# Patient Record
Sex: FEMALE | Race: WHITE | NOT HISPANIC OR LATINO | Employment: UNEMPLOYED | URBAN - METROPOLITAN AREA
[De-identification: names, ages, dates, MRNs, and addresses within clinical notes are randomized per-mention and may not be internally consistent; named-entity substitution may affect disease eponyms.]

---

## 2022-06-16 ENCOUNTER — HOSPITAL ENCOUNTER (INPATIENT)
Facility: HOSPITAL | Age: 47
LOS: 1 days | Discharge: HOME | DRG: 661 | End: 2022-06-17
Attending: EMERGENCY MEDICINE | Admitting: UROLOGY
Payer: OTHER GOVERNMENT

## 2022-06-16 ENCOUNTER — ANESTHESIA EVENT (OUTPATIENT)
Dept: SURGERY | Facility: HOSPITAL | Age: 47
DRG: 661 | End: 2022-06-16
Payer: OTHER GOVERNMENT

## 2022-06-16 DIAGNOSIS — R10.9 LEFT FLANK PAIN: ICD-10-CM

## 2022-06-16 DIAGNOSIS — N20.1 LEFT URETERAL STONE: Primary | ICD-10-CM

## 2022-06-16 DIAGNOSIS — N20.0 NEPHROLITHIASIS: ICD-10-CM

## 2022-06-16 DIAGNOSIS — N20.0 KIDNEY STONE: ICD-10-CM

## 2022-06-16 LAB
ALBUMIN SERPL BCP-MCNC: 4.2 G/DL (ref 3.5–5.2)
ALP SERPL-CCNC: 71 U/L (ref 55–135)
ALT SERPL W/O P-5'-P-CCNC: 16 U/L (ref 10–44)
ANION GAP SERPL CALC-SCNC: 11 MMOL/L (ref 8–16)
AST SERPL-CCNC: 22 U/L (ref 10–40)
BACTERIA #/AREA URNS AUTO: ABNORMAL /HPF
BASOPHILS # BLD AUTO: 0.04 K/UL (ref 0–0.2)
BASOPHILS NFR BLD: 0.3 % (ref 0–1.9)
BILIRUB SERPL-MCNC: 0.9 MG/DL (ref 0.1–1)
BILIRUB UR QL STRIP: NEGATIVE
BUN SERPL-MCNC: 11 MG/DL (ref 6–20)
CALCIUM SERPL-MCNC: 9.1 MG/DL (ref 8.7–10.5)
CHLORIDE SERPL-SCNC: 104 MMOL/L (ref 95–110)
CLARITY UR REFRACT.AUTO: ABNORMAL
CO2 SERPL-SCNC: 22 MMOL/L (ref 23–29)
COLOR UR AUTO: YELLOW
CREAT SERPL-MCNC: 0.9 MG/DL (ref 0.5–1.4)
CTP QC/QA: YES
DIFFERENTIAL METHOD: ABNORMAL
EOSINOPHIL # BLD AUTO: 0 K/UL (ref 0–0.5)
EOSINOPHIL NFR BLD: 0 % (ref 0–8)
ERYTHROCYTE [DISTWIDTH] IN BLOOD BY AUTOMATED COUNT: 12 % (ref 11.5–14.5)
EST. GFR  (AFRICAN AMERICAN): >60 ML/MIN/1.73 M^2
EST. GFR  (NON AFRICAN AMERICAN): >60 ML/MIN/1.73 M^2
GLUCOSE SERPL-MCNC: 93 MG/DL (ref 70–110)
GLUCOSE UR QL STRIP: NEGATIVE
HCT VFR BLD AUTO: 40.7 % (ref 37–48.5)
HGB BLD-MCNC: 13.5 G/DL (ref 12–16)
HGB UR QL STRIP: ABNORMAL
IMM GRANULOCYTES # BLD AUTO: 0.04 K/UL (ref 0–0.04)
IMM GRANULOCYTES NFR BLD AUTO: 0.3 % (ref 0–0.5)
KETONES UR QL STRIP: ABNORMAL
LEUKOCYTE ESTERASE UR QL STRIP: ABNORMAL
LIPASE SERPL-CCNC: 17 U/L (ref 4–60)
LYMPHOCYTES # BLD AUTO: 0.4 K/UL (ref 1–4.8)
LYMPHOCYTES NFR BLD: 2.9 % (ref 18–48)
MCH RBC QN AUTO: 30.4 PG (ref 27–31)
MCHC RBC AUTO-ENTMCNC: 33.2 G/DL (ref 32–36)
MCV RBC AUTO: 92 FL (ref 82–98)
MICROSCOPIC COMMENT: ABNORMAL
MONOCYTES # BLD AUTO: 0.7 K/UL (ref 0.3–1)
MONOCYTES NFR BLD: 4.7 % (ref 4–15)
NEUTROPHILS # BLD AUTO: 13.2 K/UL (ref 1.8–7.7)
NEUTROPHILS NFR BLD: 91.8 % (ref 38–73)
NITRITE UR QL STRIP: NEGATIVE
NRBC BLD-RTO: 0 /100 WBC
PH UR STRIP: 5 [PH] (ref 5–8)
PLATELET # BLD AUTO: 201 K/UL (ref 150–450)
PMV BLD AUTO: 10.8 FL (ref 9.2–12.9)
POTASSIUM SERPL-SCNC: 3.7 MMOL/L (ref 3.5–5.1)
PROT SERPL-MCNC: 7.5 G/DL (ref 6–8.4)
PROT UR QL STRIP: NEGATIVE
RBC # BLD AUTO: 4.44 M/UL (ref 4–5.4)
RBC #/AREA URNS AUTO: 14 /HPF (ref 0–4)
SARS-COV-2 RDRP RESP QL NAA+PROBE: NEGATIVE
SODIUM SERPL-SCNC: 137 MMOL/L (ref 136–145)
SP GR UR STRIP: 1.01 (ref 1–1.03)
SQUAMOUS #/AREA URNS AUTO: 2 /HPF
URN SPEC COLLECT METH UR: ABNORMAL
WBC # BLD AUTO: 14.34 K/UL (ref 3.9–12.7)
WBC #/AREA URNS AUTO: 17 /HPF (ref 0–5)

## 2022-06-16 PROCEDURE — 80053 COMPREHEN METABOLIC PANEL: CPT | Performed by: PHYSICIAN ASSISTANT

## 2022-06-16 PROCEDURE — G0378 HOSPITAL OBSERVATION PER HR: HCPCS

## 2022-06-16 PROCEDURE — 85025 COMPLETE CBC W/AUTO DIFF WBC: CPT | Performed by: PHYSICIAN ASSISTANT

## 2022-06-16 PROCEDURE — 25000003 PHARM REV CODE 250: Performed by: PHYSICIAN ASSISTANT

## 2022-06-16 PROCEDURE — 99285 EMERGENCY DEPT VISIT HI MDM: CPT | Mod: CS,,, | Performed by: PHYSICIAN ASSISTANT

## 2022-06-16 PROCEDURE — 99222 PR INITIAL HOSPITAL CARE,LEVL II: ICD-10-PCS | Mod: ,,, | Performed by: UROLOGY

## 2022-06-16 PROCEDURE — 99285 PR EMERGENCY DEPT VISIT,LEVEL V: ICD-10-PCS | Mod: CS,,, | Performed by: PHYSICIAN ASSISTANT

## 2022-06-16 PROCEDURE — 81001 URINALYSIS AUTO W/SCOPE: CPT | Performed by: PHYSICIAN ASSISTANT

## 2022-06-16 PROCEDURE — 96374 THER/PROPH/DIAG INJ IV PUSH: CPT

## 2022-06-16 PROCEDURE — 83690 ASSAY OF LIPASE: CPT | Performed by: PHYSICIAN ASSISTANT

## 2022-06-16 PROCEDURE — 87086 URINE CULTURE/COLONY COUNT: CPT | Performed by: PHYSICIAN ASSISTANT

## 2022-06-16 PROCEDURE — 99285 EMERGENCY DEPT VISIT HI MDM: CPT | Mod: 25

## 2022-06-16 PROCEDURE — 63600175 PHARM REV CODE 636 W HCPCS: Performed by: PHYSICIAN ASSISTANT

## 2022-06-16 PROCEDURE — U0002 COVID-19 LAB TEST NON-CDC: HCPCS | Performed by: STUDENT IN AN ORGANIZED HEALTH CARE EDUCATION/TRAINING PROGRAM

## 2022-06-16 PROCEDURE — 99222 1ST HOSP IP/OBS MODERATE 55: CPT | Mod: ,,, | Performed by: UROLOGY

## 2022-06-16 RX ORDER — PROCHLORPERAZINE EDISYLATE 5 MG/ML
5 INJECTION INTRAMUSCULAR; INTRAVENOUS EVERY 6 HOURS PRN
Status: DISCONTINUED | OUTPATIENT
Start: 2022-06-16 | End: 2022-06-17 | Stop reason: HOSPADM

## 2022-06-16 RX ORDER — SODIUM CHLORIDE AND POTASSIUM CHLORIDE 150; 900 MG/100ML; MG/100ML
INJECTION, SOLUTION INTRAVENOUS CONTINUOUS
Status: DISCONTINUED | OUTPATIENT
Start: 2022-06-17 | End: 2022-06-17 | Stop reason: HOSPADM

## 2022-06-16 RX ORDER — DEXTROSE MONOHYDRATE, SODIUM CHLORIDE, AND POTASSIUM CHLORIDE 50; 1.49; 4.5 G/1000ML; G/1000ML; G/1000ML
INJECTION, SOLUTION INTRAVENOUS CONTINUOUS
Status: DISCONTINUED | OUTPATIENT
Start: 2022-06-17 | End: 2022-06-16

## 2022-06-16 RX ORDER — KETOROLAC TROMETHAMINE 30 MG/ML
10 INJECTION, SOLUTION INTRAMUSCULAR; INTRAVENOUS
Status: COMPLETED | OUTPATIENT
Start: 2022-06-16 | End: 2022-06-16

## 2022-06-16 RX ORDER — SODIUM CHLORIDE 0.9 % (FLUSH) 0.9 %
10 SYRINGE (ML) INJECTION
Status: DISCONTINUED | OUTPATIENT
Start: 2022-06-16 | End: 2022-06-17 | Stop reason: HOSPADM

## 2022-06-16 RX ORDER — OXYCODONE HYDROCHLORIDE 10 MG/1
10 TABLET ORAL EVERY 4 HOURS PRN
Status: DISCONTINUED | OUTPATIENT
Start: 2022-06-16 | End: 2022-06-17 | Stop reason: HOSPADM

## 2022-06-16 RX ORDER — TALC
6 POWDER (GRAM) TOPICAL NIGHTLY PRN
Status: DISCONTINUED | OUTPATIENT
Start: 2022-06-16 | End: 2022-06-17 | Stop reason: HOSPADM

## 2022-06-16 RX ORDER — OXYCODONE HYDROCHLORIDE 5 MG/1
5 TABLET ORAL EVERY 4 HOURS PRN
Status: DISCONTINUED | OUTPATIENT
Start: 2022-06-16 | End: 2022-06-17 | Stop reason: HOSPADM

## 2022-06-16 RX ORDER — ONDANSETRON 2 MG/ML
4 INJECTION INTRAMUSCULAR; INTRAVENOUS EVERY 6 HOURS PRN
Status: DISCONTINUED | OUTPATIENT
Start: 2022-06-16 | End: 2022-06-17 | Stop reason: HOSPADM

## 2022-06-16 RX ORDER — ONDANSETRON 2 MG/ML
4 INJECTION INTRAMUSCULAR; INTRAVENOUS
Status: DISCONTINUED | OUTPATIENT
Start: 2022-06-16 | End: 2022-06-16

## 2022-06-16 RX ADMIN — KETOROLAC TROMETHAMINE 10 MG: 30 INJECTION, SOLUTION INTRAMUSCULAR at 01:06

## 2022-06-16 RX ADMIN — SODIUM CHLORIDE 1000 ML: 0.9 INJECTION, SOLUTION INTRAVENOUS at 01:06

## 2022-06-16 NOTE — ASSESSMENT & PLAN NOTE
- Left 8 mm UVJ stone noted on CT scan with hydronephrosis  - Unclear picture of presence of UTI due to recent antibiotic use  - Urine concerning for infection, WBC slightly elevated to 14  - Patient not NPO  - Place in observation with urology  - Pain and nausea control  - Will add on for cystoscopy, left stent placement tomorrow  - Will continue to monitor and add on more urgently if clinical condition worsens

## 2022-06-16 NOTE — SUBJECTIVE & OBJECTIVE
History reviewed. No pertinent past medical history.    History reviewed. No pertinent surgical history.    Review of patient's allergies indicates:   Allergen Reactions    Sulfa (sulfonamide antibiotics) Rash       Family History    None         Tobacco Use    Smoking status: Never Smoker    Smokeless tobacco: Never Used   Substance and Sexual Activity    Alcohol use: Yes     Comment: Daily wine    Drug use: Not Currently    Sexual activity: Not on file       Review of Systems   Constitutional:  Negative for appetite change, chills, fatigue, fever and unexpected weight change.   HENT: Negative.     Eyes: Negative.    Respiratory:  Negative for cough, chest tightness and shortness of breath.    Cardiovascular:  Negative for chest pain, palpitations and leg swelling.   Gastrointestinal:  Positive for nausea. Negative for abdominal pain, constipation, diarrhea and vomiting.   Genitourinary:  Positive for dysuria, flank pain (left), frequency and urgency. Negative for hematuria.   Musculoskeletal:  Negative for back pain.   Skin:  Negative for rash.   Neurological:  Negative for dizziness, syncope, numbness and headaches.   Hematological:  Does not bruise/bleed easily.   Psychiatric/Behavioral: Negative.       Objective:     Temp:  [98.2 °F (36.8 °C)] 98.2 °F (36.8 °C)  Pulse:  [86] 86  Resp:  [16] 16  SpO2:  [96 %] 96 %  BP: (136)/(72) 136/72     Body mass index is 20.98 kg/m².           Drains       None                   Physical Exam  Constitutional:       General: She is not in acute distress.     Appearance: She is well-developed.   Eyes:      General: No scleral icterus.  Cardiovascular:      Rate and Rhythm: Normal rate.   Pulmonary:      Effort: Pulmonary effort is normal. No respiratory distress.   Abdominal:      General: Bowel sounds are normal. There is no distension.      Palpations: Abdomen is soft.   Musculoskeletal:         General: Normal range of motion.   Skin:     General: Skin is warm and dry.       Findings: No rash.   Neurological:      Mental Status: She is alert and oriented to person, place, and time.   Psychiatric:         Behavior: Behavior normal.       Significant Labs:    BMP:  Recent Labs   Lab 06/16/22  1315      K 3.7      CO2 22*   BUN 11   CREATININE 0.9   CALCIUM 9.1       CBC:  Recent Labs   Lab 06/16/22  1315   WBC 14.34*   HGB 13.5   HCT 40.7          All pertinent labs results from the past 24 hours have been reviewed.    Significant Imaging:  All pertinent imaging results/findings from the past 24 hours have been reviewed.

## 2022-06-16 NOTE — ED NOTES
Patient states left flank and lower left abd pain,nasuea, dry heaves onset 0800, denies fevers. Completed Cipro rx today for UTI today, daily ETOH

## 2022-06-16 NOTE — NURSING
Pt arrived to room 542 AAO*4, oriented pt to room. Pt reports she has IUD.no concerns at this time. VSS

## 2022-06-16 NOTE — ANESTHESIA PREPROCEDURE EVALUATION
[facility]  Anesthesia Pre-Operative Evaluation         Patient Name/: Sonia Summers, 1975  MRN: [id number]    SUBJECTIVE:     Pre-operative evaluation for Procedure(s) (LRB):  CYSTOSCOPY, WITH URETERAL STENT INSERTION (Left)     2022    Sonia Summers is a 46 y.o. female w/o any significant PMHx presented with flank pain. Found to have L ureteral stone.     Patient now presents for the above procedure(s).    ________________________________________  ECHO: No results found for this or any previous visit.    ________________________________________    Prev airway: None documented.    LDA:       Peripheral IV - Single Lumen 22 1316 20 G Left Antecubital (Active)   Site Assessment Clean;Dry;Intact 22 1316   Line Status Blood return noted 22 1316   Number of days: 0       Drips:    [START ON 2022] 0/9% NACL & POTASSIUM CHLORIDE 20 MEQ/L         Patient Active Problem List   Diagnosis    Left renal stone    Left ureteral stone       Review of patient's allergies indicates:   Allergen Reactions    Sulfa (sulfonamide antibiotics) Rash       Current Inpatient Medications:       No current facility-administered medications on file prior to encounter.     Current Outpatient Medications on File Prior to Encounter   Medication Sig Dispense Refill    methenamine hippurate (HIPREX ORAL) Take by mouth.         History reviewed. No pertinent surgical history.    Social History:  Tobacco Use: Low Risk     Smoking Tobacco Use: Never Smoker    Smokeless Tobacco Use: Never Used       Alcohol Use: Not on file       OBJECTIVE:     Vital Signs Range:  BMI Readings from Last 1 Encounters:   22 20.98 kg/m²       Temp:  [36.8 °C (98.2 °F)-36.8 °C (98.3 °F)]   Pulse:  [74-86]   Resp:  [16-18]   BP: (126-136)/(60-72)   SpO2:  [96 %-99 %]        Significant Labs:        Component Value Date/Time    WBC 14.34 (H) 2022 1315    HGB 13.5 2022 1315    HCT 40.7  06/16/2022 1315     06/16/2022 1315     06/16/2022 1315    K 3.7 06/16/2022 1315     06/16/2022 1315    CO2 22 (L) 06/16/2022 1315    GLU 93 06/16/2022 1315    BUN 11 06/16/2022 1315    CREATININE 0.9 06/16/2022 1315    CALCIUM 9.1 06/16/2022 1315    ALBUMIN 4.2 06/16/2022 1315    PROT 7.5 06/16/2022 1315    ALKPHOS 71 06/16/2022 1315    BILITOT 0.9 06/16/2022 1315    AST 22 06/16/2022 1315    ALT 16 06/16/2022 1315        Please see Results Review for additional labs.     Diagnostic Studies: No relevant studies.    EKG:   No results found for this or any previous visit.    ECHO:  See subjective, if available.      ASSESSMENT/PLAN:           Pre-op Assessment    I have reviewed the Patient Summary Reports.     I have reviewed the Nursing Notes. I have reviewed the NPO Status.   I have reviewed the Medications.     Review of Systems  Anesthesia Hx:  No problems with previous Anesthesia Denies Hx of Anesthetic complications  History of prior surgery of interest to airway management or planning: Denies Family Hx of Anesthesia complications.   Denies Personal Hx of Anesthesia complications.   Social:  Non-Smoker    Hematology/Oncology:  Hematology Normal   Oncology Normal    -- Denies Anemia:  Denies Current/Recent Cancer  --  Denies Cancer in past history:    EENT/Dental:EENT/Dental Normal   Pulmonary:  Pulmonary Normal    Renal/:  Renal/ Normal  Denies Chronic Renal Disease.     Hepatic/GI:  Hepatic/GI Normal  Denies GERD.    Musculoskeletal:  Musculoskeletal Normal    Neurological:  Neurology Normal  Denies CVA. Denies Seizures.    Endocrine:  Endocrine Normal Denies Diabetes.    Dermatological:  Skin Normal    Psych:  Psychiatric Normal           Physical Exam  General: Well nourished, Cooperative, Alert and Oriented    Airway:  Mallampati: II / II/ I  Mouth Opening: Normal  Tongue: Normal  Neck ROM: Normal ROM    Dental:  Intact        Anesthesia Plan  Type of Anesthesia, risks & benefits  discussed:    Anesthesia Type: Gen ETT, MAC  Intra-op Monitoring Plan: Standard ASA Monitors  Post Op Pain Control Plan: multimodal analgesia and IV/PO Opioids PRN  Induction:  IV  Airway Plan: Direct, Post-Induction  Informed Consent: Informed consent signed with the Patient and all parties understand the risks and agree with anesthesia plan.  All questions answered.   ASA Score: 1  Day of Surgery Review of History & Physical: H&P Update referred to the surgeon/provider.    Ready For Surgery From Anesthesia Perspective.     .

## 2022-06-16 NOTE — ED NOTES
Patient identifiers verified and correct for Ms Summers  C/C: Flank pain, abd pain SEE NN  APPEARANCE: awake and alert in NAD.  SKIN: warm, dry and intact. No breakdown or bruising.  MUSCULOSKELETAL: Patient moving all extremities spontaneously, no obvious swelling or deformities noted. Ambulates independently.  RESPIRATORY: Denies shortness of breath.Respirations unlabored. Denies fevers  CARDIAC: Denies CP, 2+ distal pulses; no peripheral edema  ABDOMEN: reports LLQ abd pain, flank pain nasuea,   : voids spontaneously, denies difficulty  Neurologic: AAO x 4; follows commands equal strength in all extremities; denies numbness/tingling. Denies dizziness  Deneis weakness

## 2022-06-16 NOTE — CONSULTS
[facility] - Emergency Dept  Urology  Consult Note    Patient Name: Sonia Summers  MRN: [id number]  Admission Date: 6/16/2022  Hospital Length of Stay: 0   Code Status: No Order   Attending Provider: Josesito Mayo MD   Consulting Provider: Parth Alvarez MD  Primary Care Physician: Primary Doctor No  Principal Problem:<principal problem not specified>    Inpatient consult to Urology  Consult performed by: Parth Alvarez MD  Consult ordered by: Sylvia Hilario PA-C  Reason for consult: left ureteral stone          Subjective:     HPI:  45 yo female who presents to [facility] ED with 4 days of intermittent left flank pain and left ureteral stone seen on CT scan. She states the pain is 8/10 colicky flank pain with associated frequency, urgency, and dysuria. No gross hematuria, fevers, or chills. She also has been having nausea without vomiting. She thought that she had a UTI and took 3 days of cipro from her home leftover doses; last dose taken today. UA micro in ED shows 14 RBC, 17 WBC, occasional bacteria, 2 squams. WBC is 14. Creatinine is 0.9. CT scan reviewed which shows. Left mild/moderate hydronephrosis with a calcification measuring 8 mm at the left UVJ. The course of the ureter is difficult to follow but no additional calcifications are seen in the path of the ureter. There are additional stones in the left kidney, one measuring 5 mm, 7.8 mm and 8.7 mm. She just ate crackers in the ED.      History reviewed. No pertinent past medical history.    History reviewed. No pertinent surgical history.    Review of patient's allergies indicates:   Allergen Reactions    Sulfa (sulfonamide antibiotics) Rash       Family History    None         Tobacco Use    Smoking status: Never Smoker    Smokeless tobacco: Never Used   Substance and Sexual Activity    Alcohol use: Yes     Comment: Daily wine    Drug use: Not Currently    Sexual activity: Not on file       Review of Systems   Constitutional:  Negative for appetite change,  chills, fatigue, fever and unexpected weight change.   HENT: Negative.     Eyes: Negative.    Respiratory:  Negative for cough, chest tightness and shortness of breath.    Cardiovascular:  Negative for chest pain, palpitations and leg swelling.   Gastrointestinal:  Positive for nausea. Negative for abdominal pain, constipation, diarrhea and vomiting.   Genitourinary:  Positive for dysuria, flank pain (left), frequency and urgency. Negative for hematuria.   Musculoskeletal:  Negative for back pain.   Skin:  Negative for rash.   Neurological:  Negative for dizziness, syncope, numbness and headaches.   Hematological:  Does not bruise/bleed easily.   Psychiatric/Behavioral: Negative.       Objective:     Temp:  [98.2 °F (36.8 °C)] 98.2 °F (36.8 °C)  Pulse:  [86] 86  Resp:  [16] 16  SpO2:  [96 %] 96 %  BP: (136)/(72) 136/72     Body mass index is 20.98 kg/m².           Drains       None                   Physical Exam  Constitutional:       General: She is not in acute distress.     Appearance: She is well-developed.   Eyes:      General: No scleral icterus.  Cardiovascular:      Rate and Rhythm: Normal rate.   Pulmonary:      Effort: Pulmonary effort is normal. No respiratory distress.   Abdominal:      General: Bowel sounds are normal. There is no distension.      Palpations: Abdomen is soft.   Musculoskeletal:         General: Normal range of motion.   Skin:     General: Skin is warm and dry.      Findings: No rash.   Neurological:      Mental Status: She is alert and oriented to person, place, and time.   Psychiatric:         Behavior: Behavior normal.       Significant Labs:    BMP:  Recent Labs   Lab 06/16/22  1315      K 3.7      CO2 22*   BUN 11   CREATININE 0.9   CALCIUM 9.1       CBC:  Recent Labs   Lab 06/16/22  1315   WBC 14.34*   HGB 13.5   HCT 40.7          All pertinent labs results from the past 24 hours have been reviewed.    Significant Imaging:  All pertinent imaging results/findings  from the past 24 hours have been reviewed.      Assessment and Plan:     Left ureteral stone  - Left 8 mm UVJ stone noted on CT scan with hydronephrosis  - Unclear picture of presence of UTI due to recent antibiotic use  - Urine concerning for infection, WBC slightly elevated to 14  - Patient not NPO  - Place in observation with urology  - Pain and nausea control  - Will add on for cystoscopy, left stent placement tomorrow  - Will continue to monitor and add on more urgently if clinical condition worsens        VTE Risk Mitigation (From admission, onward)      None            Thank you for your consult. I will follow-up with patient. Please contact us if you have any additional questions.    Parth Alvarez MD  Urology  [facility] - Emergency Dept    Reviewed patient's imaging and chart.  Given that she just ate and is stable, will make NPO after midnight and add her on for stent tomorrow.

## 2022-06-16 NOTE — ED PROVIDER NOTES
"Encounter Date: 6/16/2022       History     Chief Complaint   Patient presents with    Flank Pain     Left sided flank pain started 0800 today, +nausea     47 y/o F with history of recurrent UTIs presents to the ED c/o L flank pain since 8a.  The pain is waxing and waning, currently 4-5/10 "cramping" to the L flank and lower abdomen and associated with significant nausea.  She has taken OTC tylenol with no relief.  She denies any known history of kidney stones.  No trauma. PSHx significant for C section x 1.  She was having some UTI symptoms last week - tried to call her outside urologist to get an rx but they were out of town.  Clinic told her it would be at least 3 days before anyone could get back to her. Her  is a physician so he called in ciprofloxacin for her - her last dose was this morning.  Since the abx, UTI symptoms have improved. She denies fever, chills, chest pain, SOB, dysuria, hematuria, frequency.     The history is provided by the patient.     Review of patient's allergies indicates:   Allergen Reactions    Sulfa (sulfonamide antibiotics) Rash     History reviewed. No pertinent past medical history.  History reviewed. No pertinent surgical history.  History reviewed. No pertinent family history.  Social History     Tobacco Use    Smoking status: Never Smoker    Smokeless tobacco: Never Used   Substance Use Topics    Alcohol use: Yes     Comment: Daily wine    Drug use: Not Currently     Review of Systems   Constitutional: Negative for chills and fever.   HENT: Negative for congestion, postnasal drip and sore throat.    Eyes: Negative for photophobia and visual disturbance.   Respiratory: Negative for cough and shortness of breath.    Cardiovascular: Negative for chest pain.   Gastrointestinal: Positive for abdominal pain and nausea. Negative for constipation, diarrhea and vomiting.   Genitourinary: Positive for flank pain. Negative for dysuria, hematuria and urgency.   Musculoskeletal: " Positive for back pain.   Skin: Negative for rash.   Neurological: Negative for syncope, weakness, light-headedness and headaches.   Psychiatric/Behavioral: Negative for confusion.       Physical Exam     Initial Vitals [06/16/22 1240]   BP Pulse Resp Temp SpO2   136/72 86 16 98.2 °F (36.8 °C) 96 %      MAP       --         Physical Exam    Nursing note and vitals reviewed.  Constitutional: She appears well-developed and well-nourished. She is not diaphoretic. No distress.   HENT:   Head: Normocephalic and atraumatic.   Neck: Neck supple.   Normal range of motion.  Cardiovascular: Normal rate, regular rhythm and normal heart sounds. Exam reveals no gallop and no friction rub.    No murmur heard.  Pulmonary/Chest: Breath sounds normal. She has no wheezes. She has no rhonchi. She has no rales.   Abdominal: Abdomen is soft. Bowel sounds are normal. There is no abdominal tenderness.   Mild discomfort to the L mid abdomen   No right CVA tenderness.  No left CVA tenderness. There is no rebound and no guarding.   Musculoskeletal:         General: Normal range of motion.      Cervical back: Normal range of motion and neck supple.     Neurological: She is alert and oriented to person, place, and time.   Skin: Skin is warm and dry.   Psychiatric: She has a normal mood and affect.         ED Course   Procedures  Labs Reviewed   URINALYSIS, REFLEX TO URINE CULTURE - Abnormal; Notable for the following components:       Result Value    Appearance, UA Hazy (*)     Ketones, UA 1+ (*)     Occult Blood UA 1+ (*)     Leukocytes, UA Trace (*)     All other components within normal limits    Narrative:     Specimen Source->Urine   CBC W/ AUTO DIFFERENTIAL - Abnormal; Notable for the following components:    WBC 14.34 (*)     Gran # (ANC) 13.2 (*)     Lymph # 0.4 (*)     Gran % 91.8 (*)     Lymph % 2.9 (*)     All other components within normal limits   COMPREHENSIVE METABOLIC PANEL - Abnormal; Notable for the following components:     CO2 22 (*)     All other components within normal limits   URINALYSIS MICROSCOPIC - Abnormal; Notable for the following components:    RBC, UA 14 (*)     WBC, UA 17 (*)     All other components within normal limits    Narrative:     Specimen Source->Urine   CULTURE, URINE   LIPASE   SARS-COV-2 RDRP GENE          Imaging Results           CT Renal Stone Study ABD Pelvis WO (Final result)  Result time 06/16/22 14:17:50    Final result by Ashvin Gorman Jr., MD (06/16/22 14:17:50)                 Impression:      There is dilatation of the left renal collecting system and ureter.  Ureter is difficult to follow to the bladder however there is a probable 5 x 8 mm calcification in the left distal ureter at or just proximal to the UVJ.    Additional left intrarenal stones as above.    Additional findings as above    This report was flagged in Epic as abnormal.      Electronically signed by: Ashvin Gorman MD  Date:    06/16/2022  Time:    14:17             Narrative:    EXAMINATION:  CT RENAL STONE STUDY ABD PELVIS WO    CLINICAL HISTORY:  Flank pain, kidney stone suspected;    TECHNIQUE:  Low dose axial images, sagittal and coronal reformations were obtained from the lung bases to the pubic symphysis.  Contrast was not administered.    COMPARISON:  None    FINDINGS:  In the chest, no significant pleural or pericardial fluid.  Lung bases are clear.    In the abdomen, liver is mildly enlarged.  Hypodensity segment 3 likely a cyst.  Gallbladder unremarkable.  No convincing pancreatic abnormality though not well visualize as the patient is quite thin and no IV contrast was used.  Spleen unremarkable.  No adrenal masses.  Right kidney is normal in size and contour.  Probable extrarenal pelvis.  Ureter not well visualized.  There are at least 3 left renal stones measuring 5, 8 and 9 mm.  Dilatation of the intrarenal collecting system and renal pelvis.  Left ureter is difficult to follow to the bladder but is at least moderately  distended.  There is a 5 x 8 mm calcification along the expected course of the left ureter at or just proximal to the UVJ.    Aorta tapers normally.  No convincing para-aortic adenopathy though evaluation is limited without oral or IV contrast.  No pelvic adenopathy.    In the pelvis bladder appears unremarkable.  IUD in the uterus.  2.7 cm hypodensity left adnexa likely a cyst.    There is a small volume of free fluid in the pelvis.  There is scattered stool and bowel gas in the colon.  No focal dilatation.  Stomach is decompressed likely accounting for the wall thickening.  Appendix appears normal.  No convincing mesenteric adenopathy.    Evaluation of the bones demonstrate satisfactory alignment.  No lytic nor blastic lesion.                                 Medications   sodium chloride 0.9% flush 10 mL (has no administration in time range)   oxyCODONE immediate release tablet 5 mg (has no administration in time range)   oxyCODONE immediate release tablet 10 mg (has no administration in time range)   ondansetron injection 4 mg (has no administration in time range)   prochlorperazine injection Soln 5 mg (has no administration in time range)   melatonin tablet 6 mg (has no administration in time range)   0.9 % NaCl with KCl 20 mEq infusion (has no administration in time range)   sodium chloride 0.9% bolus 1,000 mL (0 mLs Intravenous Stopped 6/16/22 1434)   ketorolac injection 9.999 mg (9.999 mg Intravenous Given 6/16/22 1323)     Medical Decision Making:   History:   Old Medical Records: I decided to obtain old medical records.  Clinical Tests:   Lab Tests: Ordered and Reviewed  Radiological Study: Ordered and Reviewed  Other:   I have discussed this case with another health care provider.       <> Summary of the Discussion: Urology        APC / Resident Notes:   45 y/o F with history of recurrent UTIs presents to the ED c/o L flank pain since 8a.  VSS. RRR. Lungs clear. Abdomen soft and minimally tender to the L  mid abdomen. No CVA tenderness. DDx includes but is not limited to UTI, pyelonephritis, nephrolithiasis, shingles prodrome, diverticulitis, MSK pain. Will get labs, CT renal stone.     Leukocytosis noted with WBC 14.34. Cr normal. Lipase normal. UA: nitrite negative, 14 RBC, 17 WBC - this is a treated urine as she took her last dose of ciprofloxacin this morning. Urine culture pending. Lipase normal.    CT renal stone shows 5x8mm stone at the L UVJ with L hydro.    Discussed with urology and they evaluated in the ED. They will place in observation to their service, plan for stent placement. Unfortunately, nurse provided patient with crackers so she is unable to have stent tonight.                  Clinical Impression:   Final diagnoses:  [R10.9] Left flank pain (Primary)  [N20.0] Kidney stone          ED Disposition Condition    Observation               Sylvia Hilario PA-C  06/16/22 5530     Yes

## 2022-06-17 ENCOUNTER — ANESTHESIA (OUTPATIENT)
Dept: SURGERY | Facility: HOSPITAL | Age: 47
DRG: 661 | End: 2022-06-17
Payer: OTHER GOVERNMENT

## 2022-06-17 VITALS
HEART RATE: 48 BPM | TEMPERATURE: 98 F | WEIGHT: 130 LBS | OXYGEN SATURATION: 99 % | RESPIRATION RATE: 16 BRPM | DIASTOLIC BLOOD PRESSURE: 64 MMHG | BODY MASS INDEX: 20.89 KG/M2 | SYSTOLIC BLOOD PRESSURE: 121 MMHG | HEIGHT: 66 IN

## 2022-06-17 LAB
ANION GAP SERPL CALC-SCNC: 9 MMOL/L (ref 8–16)
BACTERIA UR CULT: NO GROWTH
BASOPHILS # BLD AUTO: 0.05 K/UL (ref 0–0.2)
BASOPHILS NFR BLD: 0.8 % (ref 0–1.9)
BUN SERPL-MCNC: 9 MG/DL (ref 6–20)
CALCIUM SERPL-MCNC: 8.3 MG/DL (ref 8.7–10.5)
CHLORIDE SERPL-SCNC: 111 MMOL/L (ref 95–110)
CO2 SERPL-SCNC: 20 MMOL/L (ref 23–29)
CREAT SERPL-MCNC: 0.7 MG/DL (ref 0.5–1.4)
DIFFERENTIAL METHOD: ABNORMAL
EOSINOPHIL # BLD AUTO: 0.2 K/UL (ref 0–0.5)
EOSINOPHIL NFR BLD: 2.6 % (ref 0–8)
ERYTHROCYTE [DISTWIDTH] IN BLOOD BY AUTOMATED COUNT: 12.2 % (ref 11.5–14.5)
EST. GFR  (AFRICAN AMERICAN): >60 ML/MIN/1.73 M^2
EST. GFR  (NON AFRICAN AMERICAN): >60 ML/MIN/1.73 M^2
GLUCOSE SERPL-MCNC: 90 MG/DL (ref 70–110)
HCT VFR BLD AUTO: 34.8 % (ref 37–48.5)
HGB BLD-MCNC: 11.5 G/DL (ref 12–16)
IMM GRANULOCYTES # BLD AUTO: 0.01 K/UL (ref 0–0.04)
IMM GRANULOCYTES NFR BLD AUTO: 0.2 % (ref 0–0.5)
LYMPHOCYTES # BLD AUTO: 1.6 K/UL (ref 1–4.8)
LYMPHOCYTES NFR BLD: 26 % (ref 18–48)
MCH RBC QN AUTO: 30.5 PG (ref 27–31)
MCHC RBC AUTO-ENTMCNC: 33 G/DL (ref 32–36)
MCV RBC AUTO: 92 FL (ref 82–98)
MONOCYTES # BLD AUTO: 0.5 K/UL (ref 0.3–1)
MONOCYTES NFR BLD: 8.6 % (ref 4–15)
NEUTROPHILS # BLD AUTO: 3.8 K/UL (ref 1.8–7.7)
NEUTROPHILS NFR BLD: 61.8 % (ref 38–73)
NRBC BLD-RTO: 0 /100 WBC
PLATELET # BLD AUTO: 163 K/UL (ref 150–450)
PMV BLD AUTO: 11.6 FL (ref 9.2–12.9)
POTASSIUM SERPL-SCNC: 4.1 MMOL/L (ref 3.5–5.1)
RBC # BLD AUTO: 3.77 M/UL (ref 4–5.4)
SODIUM SERPL-SCNC: 140 MMOL/L (ref 136–145)
WBC # BLD AUTO: 6.07 K/UL (ref 3.9–12.7)

## 2022-06-17 PROCEDURE — C1769 GUIDE WIRE: HCPCS | Performed by: UROLOGY

## 2022-06-17 PROCEDURE — 25000003 PHARM REV CODE 250: Performed by: STUDENT IN AN ORGANIZED HEALTH CARE EDUCATION/TRAINING PROGRAM

## 2022-06-17 PROCEDURE — 80048 BASIC METABOLIC PNL TOTAL CA: CPT | Performed by: STUDENT IN AN ORGANIZED HEALTH CARE EDUCATION/TRAINING PROGRAM

## 2022-06-17 PROCEDURE — 36000707: Performed by: UROLOGY

## 2022-06-17 PROCEDURE — 85025 COMPLETE CBC W/AUTO DIFF WBC: CPT | Performed by: STUDENT IN AN ORGANIZED HEALTH CARE EDUCATION/TRAINING PROGRAM

## 2022-06-17 PROCEDURE — 74420 PR  X-RAY RETROGRADE PYELOGRAM: ICD-10-PCS | Mod: 26,,, | Performed by: UROLOGY

## 2022-06-17 PROCEDURE — 63600175 PHARM REV CODE 636 W HCPCS: Performed by: NURSE ANESTHETIST, CERTIFIED REGISTERED

## 2022-06-17 PROCEDURE — 71000015 HC POSTOP RECOV 1ST HR: Performed by: UROLOGY

## 2022-06-17 PROCEDURE — 36000706: Performed by: UROLOGY

## 2022-06-17 PROCEDURE — 11000001 HC ACUTE MED/SURG PRIVATE ROOM

## 2022-06-17 PROCEDURE — 25000003 PHARM REV CODE 250: Performed by: NURSE ANESTHETIST, CERTIFIED REGISTERED

## 2022-06-17 PROCEDURE — D9220A PRA ANESTHESIA: Mod: ANES,,, | Performed by: STUDENT IN AN ORGANIZED HEALTH CARE EDUCATION/TRAINING PROGRAM

## 2022-06-17 PROCEDURE — 37000009 HC ANESTHESIA EA ADD 15 MINS: Performed by: UROLOGY

## 2022-06-17 PROCEDURE — D9220A PRA ANESTHESIA: Mod: CRNA,,, | Performed by: NURSE ANESTHETIST, CERTIFIED REGISTERED

## 2022-06-17 PROCEDURE — 52332 CYSTOSCOPY AND TREATMENT: CPT | Mod: LT,,, | Performed by: UROLOGY

## 2022-06-17 PROCEDURE — 74420 UROGRAPHY RTRGR +-KUB: CPT | Mod: 26,,, | Performed by: UROLOGY

## 2022-06-17 PROCEDURE — 36415 COLL VENOUS BLD VENIPUNCTURE: CPT | Performed by: STUDENT IN AN ORGANIZED HEALTH CARE EDUCATION/TRAINING PROGRAM

## 2022-06-17 PROCEDURE — C1758 CATHETER, URETERAL: HCPCS | Performed by: UROLOGY

## 2022-06-17 PROCEDURE — 63600175 PHARM REV CODE 636 W HCPCS: Performed by: STUDENT IN AN ORGANIZED HEALTH CARE EDUCATION/TRAINING PROGRAM

## 2022-06-17 PROCEDURE — 25000003 PHARM REV CODE 250: Performed by: UROLOGY

## 2022-06-17 PROCEDURE — 71000044 HC DOSC ROUTINE RECOVERY FIRST HOUR: Performed by: UROLOGY

## 2022-06-17 PROCEDURE — 25500020 PHARM REV CODE 255: Performed by: UROLOGY

## 2022-06-17 PROCEDURE — 37000008 HC ANESTHESIA 1ST 15 MINUTES: Performed by: UROLOGY

## 2022-06-17 PROCEDURE — 52332 PR CYSTOSCOPY,INSERT URETERAL STENT: ICD-10-PCS | Mod: LT,,, | Performed by: UROLOGY

## 2022-06-17 PROCEDURE — D9220A PRA ANESTHESIA: ICD-10-PCS | Mod: CRNA,,, | Performed by: NURSE ANESTHETIST, CERTIFIED REGISTERED

## 2022-06-17 PROCEDURE — D9220A PRA ANESTHESIA: ICD-10-PCS | Mod: ANES,,, | Performed by: STUDENT IN AN ORGANIZED HEALTH CARE EDUCATION/TRAINING PROGRAM

## 2022-06-17 PROCEDURE — 96361 HYDRATE IV INFUSION ADD-ON: CPT

## 2022-06-17 PROCEDURE — C2617 STENT, NON-COR, TEM W/O DEL: HCPCS | Performed by: UROLOGY

## 2022-06-17 DEVICE — STENT URETERAL UNIV 6FR 28CM
Type: IMPLANTABLE DEVICE | Site: URETER | Status: NON-FUNCTIONAL
Removed: 2022-07-08

## 2022-06-17 RX ORDER — KETOROLAC TROMETHAMINE 10 MG/1
10 TABLET, FILM COATED ORAL EVERY 6 HOURS
Qty: 20 TABLET | Refills: 0 | Status: SHIPPED | OUTPATIENT
Start: 2022-06-17 | End: 2022-06-22

## 2022-06-17 RX ORDER — LIDOCAINE HYDROCHLORIDE 20 MG/ML
JELLY TOPICAL
Status: DISCONTINUED | OUTPATIENT
Start: 2022-06-17 | End: 2022-06-17 | Stop reason: HOSPADM

## 2022-06-17 RX ORDER — ACETAMINOPHEN 500 MG
1000 TABLET ORAL ONCE
Status: COMPLETED | OUTPATIENT
Start: 2022-06-17 | End: 2022-06-17

## 2022-06-17 RX ORDER — ONDANSETRON 2 MG/ML
4 INJECTION INTRAMUSCULAR; INTRAVENOUS DAILY PRN
Status: DISCONTINUED | OUTPATIENT
Start: 2022-06-17 | End: 2022-06-17 | Stop reason: HOSPADM

## 2022-06-17 RX ORDER — MIDAZOLAM HYDROCHLORIDE 1 MG/ML
INJECTION, SOLUTION INTRAMUSCULAR; INTRAVENOUS
Status: DISCONTINUED | OUTPATIENT
Start: 2022-06-17 | End: 2022-06-17

## 2022-06-17 RX ORDER — PROPOFOL 10 MG/ML
VIAL (ML) INTRAVENOUS CONTINUOUS PRN
Status: DISCONTINUED | OUTPATIENT
Start: 2022-06-17 | End: 2022-06-17

## 2022-06-17 RX ORDER — SODIUM CHLORIDE 0.9 % (FLUSH) 0.9 %
10 SYRINGE (ML) INJECTION
Status: DISCONTINUED | OUTPATIENT
Start: 2022-06-17 | End: 2022-06-17 | Stop reason: HOSPADM

## 2022-06-17 RX ORDER — FENTANYL CITRATE 50 UG/ML
INJECTION, SOLUTION INTRAMUSCULAR; INTRAVENOUS
Status: DISCONTINUED | OUTPATIENT
Start: 2022-06-17 | End: 2022-06-17

## 2022-06-17 RX ORDER — CIPROFLOXACIN 500 MG/1
500 TABLET ORAL EVERY 12 HOURS
Qty: 14 TABLET | Refills: 0 | Status: SHIPPED | OUTPATIENT
Start: 2022-06-17 | End: 2022-06-22

## 2022-06-17 RX ORDER — OXYBUTYNIN CHLORIDE 5 MG/1
5 TABLET ORAL 3 TIMES DAILY
Qty: 30 TABLET | Refills: 0 | Status: SHIPPED | OUTPATIENT
Start: 2022-06-17 | End: 2022-08-31

## 2022-06-17 RX ORDER — FENTANYL CITRATE 50 UG/ML
25 INJECTION, SOLUTION INTRAMUSCULAR; INTRAVENOUS EVERY 5 MIN PRN
Status: DISCONTINUED | OUTPATIENT
Start: 2022-06-17 | End: 2022-06-17 | Stop reason: HOSPADM

## 2022-06-17 RX ORDER — PHENAZOPYRIDINE HYDROCHLORIDE 100 MG/1
100 TABLET, FILM COATED ORAL 3 TIMES DAILY PRN
Qty: 30 TABLET | Refills: 0 | Status: SHIPPED | OUTPATIENT
Start: 2022-06-17 | End: 2022-06-27

## 2022-06-17 RX ORDER — CIPROFLOXACIN 500 MG/1
500 TABLET ORAL EVERY 12 HOURS
Qty: 10 TABLET | Refills: 0 | Status: SHIPPED | OUTPATIENT
Start: 2022-06-17 | End: 2022-06-17 | Stop reason: SDUPTHER

## 2022-06-17 RX ORDER — LIDOCAINE HYDROCHLORIDE 20 MG/ML
INJECTION, SOLUTION EPIDURAL; INFILTRATION; INTRACAUDAL; PERINEURAL
Status: DISCONTINUED | OUTPATIENT
Start: 2022-06-17 | End: 2022-06-17

## 2022-06-17 RX ADMIN — MIDAZOLAM 2 MG: 1 INJECTION INTRAMUSCULAR; INTRAVENOUS at 11:06

## 2022-06-17 RX ADMIN — PROPOFOL 200 MCG/KG/MIN: 10 INJECTION, EMULSION INTRAVENOUS at 11:06

## 2022-06-17 RX ADMIN — SODIUM CHLORIDE AND POTASSIUM CHLORIDE: .9; .15 SOLUTION INTRAVENOUS at 12:06

## 2022-06-17 RX ADMIN — SODIUM CHLORIDE: 0.9 INJECTION, SOLUTION INTRAVENOUS at 11:06

## 2022-06-17 RX ADMIN — ACETAMINOPHEN 1000 MG: 500 TABLET ORAL at 12:06

## 2022-06-17 RX ADMIN — SODIUM CHLORIDE AND POTASSIUM CHLORIDE: .9; .15 SOLUTION INTRAVENOUS at 01:06

## 2022-06-17 RX ADMIN — DEXTROSE 2 G: 50 INJECTION, SOLUTION INTRAVENOUS at 11:06

## 2022-06-17 RX ADMIN — LIDOCAINE HYDROCHLORIDE 50 MG: 20 INJECTION, SOLUTION EPIDURAL; INFILTRATION; INTRACAUDAL at 11:06

## 2022-06-17 RX ADMIN — FENTANYL CITRATE 50 MCG: 50 INJECTION INTRAMUSCULAR; INTRAVENOUS at 11:06

## 2022-06-17 NOTE — PLAN OF CARE
Problem: Adult Inpatient Plan of Care  Goal: Plan of Care Review  Outcome: Ongoing, Progressing  Goal: Patient-Specific Goal (Individualized)  Outcome: Ongoing, Progressing  Goal: Absence of Hospital-Acquired Illness or Injury  Outcome: Ongoing, Progressing  Goal: Optimal Comfort and Wellbeing  Outcome: Ongoing, Progressing  Goal: Readiness for Transition of Care  Outcome: Ongoing, Progressing     Pt DC per MD. IV removed and dressed per protocol. DC paperwork explained and all questions answered. All personal belongings are with pt and . Pt left via wheelchair.

## 2022-06-17 NOTE — H&P (VIEW-ONLY)
[facility]  Urology  Progress Note    Patient Name: Sonia Summers  MRN: [id number]  Admission Date: 6/16/2022  Hospital Length of Stay: 0 days  Code Status: Full Code   Attending Provider: Jazz Wood MD   Primary Care Physician: Primary Doctor No    Subjective:     HPI:  47 yo female who presents to [facility] ED with 4 days of intermittent left flank pain and left ureteral stone seen on CT scan. She states the pain is 8/10 colicky flank pain with associated frequency, urgency, and dysuria. No gross hematuria, fevers, or chills. She also has been having nausea without vomiting. She thought that she had a UTI and took 3 days of cipro from her home leftover doses; last dose taken today. UA micro in ED shows 14 RBC, 17 WBC, occasional bacteria, 2 squams. WBC is 14. Creatinine is 0.9. CT scan reviewed which shows. Left mild/moderate hydronephrosis with a calcification measuring 8 mm at the left UVJ. The course of the ureter is difficult to follow but no additional calcifications are seen in the path of the ureter. There are additional stones in the left kidney, one measuring 5 mm, 7.8 mm and 8.7 mm. She just ate crackers in the ED.      Interval History: NAEON. AFVSS.  Pain controlled.  Having some intermittent left flank pain.    Objective:     Temp:  [96.2 °F (35.7 °C)-98.3 °F (36.8 °C)] 98.2 °F (36.8 °C)  Pulse:  [56-86] 57  Resp:  [16-18] 16  SpO2:  [96 %-100 %] 100 %  BP: (111-139)/(60-72) 125/71     Body mass index is 20.98 kg/m².           Drains       None                   Physical Exam  Constitutional:       General: She is not in acute distress.     Appearance: She is well-developed.   Eyes:      General: No scleral icterus.  Cardiovascular:      Rate and Rhythm: Normal rate.   Pulmonary:      Effort: Pulmonary effort is normal. No respiratory distress.   Abdominal:      General: Bowel sounds are normal. There is no distension.      Palpations: Abdomen is soft.   Musculoskeletal:         General:  Normal range of motion.   Skin:     General: Skin is warm and dry.      Findings: No rash.   Neurological:      Mental Status: She is alert and oriented to person, place, and time.   Psychiatric:         Behavior: Behavior normal.       Significant Labs:    BMP:  Recent Labs   Lab 06/16/22  1315 06/17/22  0325    140   K 3.7 4.1    111*   CO2 22* 20*   BUN 11 9   CREATININE 0.9 0.7   CALCIUM 9.1 8.3*       CBC:   Recent Labs   Lab 06/16/22  1315 06/17/22  0325   WBC 14.34* 6.07   HGB 13.5 11.5*   HCT 40.7 34.8*    163       All pertinent labs results from the past 24 hours have been reviewed.    Significant Imaging:  All pertinent imaging results/findings from the past 24 hours have been reviewed.      Assessment/Plan:     Left ureteral stone  - NPO  - To OR today for left stent placement  - Will need definitive stone surgery at later date due to concern for infection  - Pain and nausea control  - Will continue to monitor and add on more urgently if clinical condition worsens        VTE Risk Mitigation (From admission, onward)         Ordered     IP VTE LOW RISK PATIENT  Once         06/16/22 1555     Place EAMON hose  Until discontinued         06/16/22 1555     Place sequential compression device  Until discontinued         06/16/22 1555                aPrth Alvarez MD  Urology  [facility] - Surgery

## 2022-06-17 NOTE — PLAN OF CARE
Patient AAOx4, calm. VS stable, afebrile. Ambulatory. Skin intact. Free from falls. Bed at lowest point, side rails up x2 and call light within reach.   Problem: Adult Inpatient Plan of Care  Goal: Plan of Care Review  Outcome: Ongoing, Progressing  Goal: Absence of Hospital-Acquired Illness or Injury  Outcome: Ongoing, Progressing

## 2022-06-17 NOTE — SUBJECTIVE & OBJECTIVE
Interval History: NAEON. AFVSS.  Pain controlled.  Having some intermittent left flank pain.    Objective:     Temp:  [96.2 °F (35.7 °C)-98.3 °F (36.8 °C)] 98.2 °F (36.8 °C)  Pulse:  [56-86] 57  Resp:  [16-18] 16  SpO2:  [96 %-100 %] 100 %  BP: (111-139)/(60-72) 125/71     Body mass index is 20.98 kg/m².           Drains       None                   Physical Exam  Constitutional:       General: She is not in acute distress.     Appearance: She is well-developed.   Eyes:      General: No scleral icterus.  Cardiovascular:      Rate and Rhythm: Normal rate.   Pulmonary:      Effort: Pulmonary effort is normal. No respiratory distress.   Abdominal:      General: Bowel sounds are normal. There is no distension.      Palpations: Abdomen is soft.   Musculoskeletal:         General: Normal range of motion.   Skin:     General: Skin is warm and dry.      Findings: No rash.   Neurological:      Mental Status: She is alert and oriented to person, place, and time.   Psychiatric:         Behavior: Behavior normal.       Significant Labs:    BMP:  Recent Labs   Lab 06/16/22  1315 06/17/22  0325    140   K 3.7 4.1    111*   CO2 22* 20*   BUN 11 9   CREATININE 0.9 0.7   CALCIUM 9.1 8.3*       CBC:   Recent Labs   Lab 06/16/22  1315 06/17/22  0325   WBC 14.34* 6.07   HGB 13.5 11.5*   HCT 40.7 34.8*    163       All pertinent labs results from the past 24 hours have been reviewed.    Significant Imaging:  All pertinent imaging results/findings from the past 24 hours have been reviewed.

## 2022-06-17 NOTE — PROGRESS NOTES
Dr. Wood's service paged to speak with pt and  regarding procedure.  Pt also states would like something not as stong as oxycodone for pain 3-4/10.  Dr. Doherty comes to bedside to speak with pt and .

## 2022-06-17 NOTE — PATIENT INSTRUCTIONS

## 2022-06-17 NOTE — PLAN OF CARE
[facility] - Surgery  Initial Discharge Assessment       Primary Care Provider: Primary Doctor No    Admission Diagnosis: Kidney stone [N20.0]  Left flank pain [R10.9]  Left ureteral stone [N20.1]    Admission Date: 6/16/2022  Expected Discharge Date: 6/17/2022    Discharge Barriers Identified: None    Payor:  / Plan:  [employer] / Product Type: Government /     Extended Emergency Contact Information  Primary Emergency Contact: Kristopherchelsie  Mobile Phone: [phone]  Relation: Spouse  Preferred language: English   needed? No    Discharge Plan A: Home with family  Discharge Plan B: Home Health, Home with family      [id number] [place], [place] - [address] AT [address]  [address]  [facility] [address]  Phone: [phone] Fax: [phone]      Initial Assessment (most recent)     Adult Discharge Assessment - 06/17/22 1450        Discharge Assessment    Assessment Type Discharge Planning Assessment     Confirmed/corrected address, phone number and insurance Yes     Confirmed Demographics Correct on Facesheet     Source of Information patient;family   spouse    Communicated ODILON with patient/caregiver Yes     Lives With spouse     Do you expect to return to your current living situation? Yes     Do you have help at home or someone to help you manage your care at home? Yes     Who are your caregiver(s) and their phone number(s)? spouse     Prior to hospitilization cognitive status: Alert/Oriented     Current cognitive status: Alert/Oriented     Home Layout Bathroom on 2nd floor;Bedroom on 2nd floor     Equipment Currently Used at Home none     Do you currently have service(s) that help you manage your care at home? No     Do you take prescription medications? Yes     Do you have prescription coverage? Yes     Do you have any problems affording any of your prescribed medications? No     Is the patient taking medications as prescribed? yes     How  do you get to doctors appointments? car, drives self     Are you on dialysis? No     Do you take coumadin? No     Discharge Plan A Home with family     Discharge Plan B Home Health;Home with family     DME Needed Upon Discharge  none     Discharge Plan discussed with: Spouse/sig other;Patient     Discharge Barriers Identified None               Patient lives with spouse in a two story home with her bed/bath on second floor with 17 stairs. Patient does not fee she will need any post acute services. Patient spouse to provide transportation home.

## 2022-06-17 NOTE — OP NOTE
[facility]  Operative Note    Date: 06/17/2022    Pre-Op Diagnosis: left ureteral stone  Patient Active Problem List    Diagnosis Date Noted    Left renal stone 06/16/2022    Left ureteral stone 06/16/2022       Post-Op Diagnosis: same    Procedure(s) Performed:   1.  Cystoscopy with left retrograde pyelogram  2.  Fluoroscopy < 1 hour  3.  Intra-operative interpretation of radiographic images  4.  Lleft ureteral JJ stent placement    Specimen(s): none    Staff Surgeon: Jazz Wood MD    Assistant Surgeon: Joe Chopra MD    Anesthesia: General mask inhalational anesthesia    Indications: Sonia Summers is a 46 y.o. female with a left ureteral stone.    Findings: left distal ureteral stone seen on  imaging. Left UO easily cannulated with a 5Fr open ended catheter. Left RPG showing the left ureteral stone in place with mild hydronephrosis. Delicate calices with no sings of obstruction in the renal pelvis. Left ureteral stent advanced with ease and confirmed to be in correct position on fluoro.     Estimated Blood Loss: min    Drains:  6 Korean x 28 cm left JJ ureteral stent without strings    Procedure in Detail:  After risks, benefits and possible complications of cystoscopy were explained, the patient elected to undergo the procedure and informed consent was obtained. All questions were answered in the gabriel-operative area. The patient was transferred to the cystoscopy suite and placed in the supine position.  SCDs were applied and working.  MAC anesthesia was administered.  Once adequately sedated, the patient was placed in the dorsal lithotomy position and prepped and draped in the usual sterile fashion.  Time out was performed, gabriel-procedural antibiotics were confirmed.     A rigid cystoscope in a 22 Fr sheath was introduced into the bladder per urethra. This passed easily. The entire urethra was visualized which showed no masses or strictures.  The right and left ureteral  orifices were identified in the normal anatomic position.  Formal cystoscopy was performed which revealed no masses or lesions suspicious for malignancy, no trabeculations, no bladder stones and no bladder diverticula.     The left UO was identified and cannulated with a 5 Fr open-ended ureteral catheter. Using fluoroscopy, a RPG was performed which showed the above findings.    Our attention was turned to the patient's left ureteral orifice.  A Motion wire was advanced up the left ureteral orifice to the level of the expected renal pelvis through the 5Fr open ended ureteral catheter.  This was confirmed using fluoroscopy. The ureteral catheter was then removed in its entirety.    We then passed a 6 Fr x 28 cm JJ ureteral stent without strings over the wire to the level of the renal pelvis under direct vision as well as flouroscopy. The motion wire was removed.  A 180 degree coil was observed in the renal pelvis as well as the bladder using fluoroscopy.  A 180 degree coil was also seen using direct visualization in the bladder.     The bladder was drained, and the patient was removed from lithotomy.     The patient tolerated the procedure well and was transferred to recovery in stable condition.    Disposition:  The patient will follow up with Dr. Sprague in 2 weeks for definitive stone management.     MD MAGED Powers was present for the entire case and agree with the above note.

## 2022-06-17 NOTE — TRANSFER OF CARE
"Anesthesia Transfer of Care Note    Patient: Sonia Summers    Procedure(s) Performed: Procedure(s) (LRB):  CYSTOSCOPY, WITH URETERAL STENT INSERTION (Left)    Patient location: PACU    Anesthesia Type: general    Transport from OR: Transported from OR on 6-10 L/min O2 by face mask with adequate spontaneous ventilation    Post pain: adequate analgesia    Post assessment: no apparent anesthetic complications and tolerated procedure well    Level of consciousness: sedated    Nausea/Vomiting: no nausea/vomiting    Complications: none    Transfer of care protocol was followed      Last vitals:   Visit Vitals  /66 (BP Location: Right arm, Patient Position: Lying)   Pulse 65   Temp 36.6 °C (97.9 °F) (Tympanic)   Resp 18   Ht 5' 6" (1.676 m)   Wt 59 kg (130 lb)   LMP 06/02/2022   SpO2 98%   Breastfeeding No   BMI 20.98 kg/m²     "

## 2022-06-17 NOTE — ASSESSMENT & PLAN NOTE
- NPO  - To OR today for left stent placement  - Will need definitive stone surgery at later date due to concern for infection  - Pain and nausea control  - Will continue to monitor and add on more urgently if clinical condition worsens

## 2022-06-17 NOTE — PROGRESS NOTES
[facility]  Urology  Progress Note    Patient Name: Sonia Summers  MRN: [id number]  Admission Date: 6/16/2022  Hospital Length of Stay: 0 days  Code Status: Full Code   Attending Provider: Jazz Wood MD   Primary Care Physician: Primary Doctor No    Subjective:     HPI:  47 yo female who presents to [facility] ED with 4 days of intermittent left flank pain and left ureteral stone seen on CT scan. She states the pain is 8/10 colicky flank pain with associated frequency, urgency, and dysuria. No gross hematuria, fevers, or chills. She also has been having nausea without vomiting. She thought that she had a UTI and took 3 days of cipro from her home leftover doses; last dose taken today. UA micro in ED shows 14 RBC, 17 WBC, occasional bacteria, 2 squams. WBC is 14. Creatinine is 0.9. CT scan reviewed which shows. Left mild/moderate hydronephrosis with a calcification measuring 8 mm at the left UVJ. The course of the ureter is difficult to follow but no additional calcifications are seen in the path of the ureter. There are additional stones in the left kidney, one measuring 5 mm, 7.8 mm and 8.7 mm. She just ate crackers in the ED.      Interval History: NAEON. AFVSS.  Pain controlled.  Having some intermittent left flank pain.    Objective:     Temp:  [96.2 °F (35.7 °C)-98.3 °F (36.8 °C)] 98.2 °F (36.8 °C)  Pulse:  [56-86] 57  Resp:  [16-18] 16  SpO2:  [96 %-100 %] 100 %  BP: (111-139)/(60-72) 125/71     Body mass index is 20.98 kg/m².           Drains       None                   Physical Exam  Constitutional:       General: She is not in acute distress.     Appearance: She is well-developed.   Eyes:      General: No scleral icterus.  Cardiovascular:      Rate and Rhythm: Normal rate.   Pulmonary:      Effort: Pulmonary effort is normal. No respiratory distress.   Abdominal:      General: Bowel sounds are normal. There is no distension.      Palpations: Abdomen is soft.   Musculoskeletal:         General:  Normal range of motion.   Skin:     General: Skin is warm and dry.      Findings: No rash.   Neurological:      Mental Status: She is alert and oriented to person, place, and time.   Psychiatric:         Behavior: Behavior normal.       Significant Labs:    BMP:  Recent Labs   Lab 06/16/22  1315 06/17/22  0325    140   K 3.7 4.1    111*   CO2 22* 20*   BUN 11 9   CREATININE 0.9 0.7   CALCIUM 9.1 8.3*       CBC:   Recent Labs   Lab 06/16/22  1315 06/17/22  0325   WBC 14.34* 6.07   HGB 13.5 11.5*   HCT 40.7 34.8*    163       All pertinent labs results from the past 24 hours have been reviewed.    Significant Imaging:  All pertinent imaging results/findings from the past 24 hours have been reviewed.      Assessment/Plan:     Left ureteral stone  - NPO  - To OR today for left stent placement  - Will need definitive stone surgery at later date due to concern for infection  - Pain and nausea control  - Will continue to monitor and add on more urgently if clinical condition worsens        VTE Risk Mitigation (From admission, onward)         Ordered     IP VTE LOW RISK PATIENT  Once         06/16/22 1555     Place EAMON hose  Until discontinued         06/16/22 1555     Place sequential compression device  Until discontinued         06/16/22 1555                Parth Alvarez MD  Urology  [facility] - Surgery

## 2022-06-17 NOTE — DISCHARGE SUMMARY
[facility] (1st Fl)  Discharge Note  Short Stay    Procedure(s) (LRB):  CYSTOSCOPY, WITH URETERAL STENT INSERTION (Left)    OUTCOME: Patient tolerated treatment/procedure well without complication and is now ready for discharge.    DISPOSITION: Home or Self Care    FINAL DIAGNOSIS:  Left ureteral stone    FOLLOWUP: In clinic    DISCHARGE INSTRUCTIONS:    Discharge Procedure Orders   No dressing needed     Notify your health care provider if you experience any of the following:  temperature >100.4     Notify your health care provider if you experience any of the following:  persistent nausea and vomiting or diarrhea     Notify your health care provider if you experience any of the following:  severe uncontrolled pain     Notify your health care provider if you experience any of the following:  difficulty breathing or increased cough     Notify your health care provider if you experience any of the following:  severe persistent headache     Notify your health care provider if you experience any of the following:  worsening rash     Notify your health care provider if you experience any of the following:  persistent dizziness, light-headedness, or visual disturbances     Activity as tolerated        TIME SPENT ON DISCHARGE: 15 minutes    As above.

## 2022-06-17 NOTE — PLAN OF CARE
Unable to complete full body assessment and vitals due to time constraints. OR teamand  OR Nurse at bedside. Safety checklist completed and ready for pt in OR.

## 2022-06-17 NOTE — NURSING TRANSFER
Nursing Transfer Note      6/17/2022     Reason patient is being transferred: post op  Transfer   slot 20 to room 542A  Transfer via wheelchair    Transfer with pt transport  Transported by  [facility] pt transport    Medicines sent: none  Any special needs or follow-up needed: none  Chart send with patient: yes  Notified: 6/17/2022 @ 1249 report called and given to Sofía Mendoza RN  Patient reassessed at:6/17/2022 @ 1230  Upon arrival to floor:

## 2022-06-19 NOTE — ANESTHESIA POSTPROCEDURE EVALUATION
Anesthesia Post Evaluation    Patient: Sonia Summers    Procedure(s) Performed: Procedure(s) (LRB):  CYSTOSCOPY, WITH URETERAL STENT INSERTION (Left)    Final Anesthesia Type: general      Patient location during evaluation: PACU  Patient participation: Yes- Able to Participate  Level of consciousness: awake and alert  Post-procedure vital signs: reviewed and stable  Pain management: adequate  Airway patency: patent    PONV status at discharge: No PONV  Anesthetic complications: no      Cardiovascular status: stable  Respiratory status: spontaneous ventilation and face mask  Hydration status: euvolemic  Follow-up not needed.          Vitals Value Taken Time   /72 06/17/22 1232   Temp 36.5 °C (97.7 °F) 06/17/22 1242   Pulse 68 06/17/22 1247   Resp 21 06/17/22 1246   SpO2 100 % 06/17/22 1247   Vitals shown include unvalidated device data.      No case tracking events are documented in the log.      Pain/Palmer Score: Pain Rating Prior to Med Admin: 4 (6/17/2022 12:42 PM)  Palmer Score: 10 (6/17/2022 12:30 PM)

## 2022-06-20 NOTE — PLAN OF CARE
[facility]  Discharge Final Note    Primary Care Provider: Primary Doctor No    Expected Discharge Date: 6/17/2022    Final Discharge Note (most recent)     Final Note - 06/17/22 1523        Final Note    Assessment Type Final Discharge Note     Anticipated Discharge Disposition Home or Self Care     What phone number can be called within the next 1-3 days to see how you are doing after discharge? [phone]     Hospital Resources/Appts/Education Provided Provided patient/caregiver with written discharge plan information;Appointments scheduled by Navigator/Coordinator                 Future Appointments   Date Time Provider Department Center   6/28/2022  9:45 AM Virgie Sprague MD [facility] UROLOGY [facility]            Contact Info     Virgie Sprague MD   Specialty: Urology    [address]  [facility] [address]   Phone: [phone]       Next Steps: Follow up in 1 week(s)    Instructions: Definitive stone managment

## 2022-06-22 ENCOUNTER — TELEPHONE (OUTPATIENT)
Dept: UROLOGY | Facility: CLINIC | Age: 47
End: 2022-06-22
Payer: OTHER GOVERNMENT

## 2022-06-22 DIAGNOSIS — N20.1 LEFT URETERAL STONE: Primary | ICD-10-CM

## 2022-06-22 DIAGNOSIS — N20.0 LEFT RENAL STONE: ICD-10-CM

## 2022-06-22 RX ORDER — CIPROFLOXACIN 500 MG/1
500 TABLET ORAL 2 TIMES DAILY
Qty: 6 TABLET | Refills: 0 | Status: SHIPPED | OUTPATIENT
Start: 2022-06-22 | End: 2022-06-25

## 2022-06-22 NOTE — TELEPHONE ENCOUNTER
----- Message from Joe Chopra MD sent at 6/17/2022 12:03 PM CDT -----  Regarding: left URS  Doroteo Sprague,    Dr. Wood would like to see if you would be able to do a left URS for Ms. Summers in the coming 1-2weeks. She has a distal let ureteral stone. We stented her today.     Thank you so much for your help!  Axel

## 2022-06-27 ENCOUNTER — TELEPHONE (OUTPATIENT)
Dept: UROLOGY | Facility: CLINIC | Age: 47
End: 2022-06-27
Payer: OTHER GOVERNMENT

## 2022-06-29 ENCOUNTER — LAB VISIT (OUTPATIENT)
Dept: LAB | Facility: HOSPITAL | Age: 47
End: 2022-06-29
Attending: UROLOGY
Payer: OTHER GOVERNMENT

## 2022-06-29 DIAGNOSIS — N20.0 LEFT RENAL STONE: ICD-10-CM

## 2022-06-29 DIAGNOSIS — N20.1 LEFT URETERAL STONE: ICD-10-CM

## 2022-06-29 PROCEDURE — 87086 URINE CULTURE/COLONY COUNT: CPT | Performed by: UROLOGY

## 2022-06-30 LAB — BACTERIA UR CULT: NORMAL

## 2022-07-07 ENCOUNTER — TELEPHONE (OUTPATIENT)
Dept: UROLOGY | Facility: CLINIC | Age: 47
End: 2022-07-07
Payer: OTHER GOVERNMENT

## 2022-07-07 RX ORDER — CIPROFLOXACIN 500 MG/1
500 TABLET ORAL
COMMUNITY
End: 2022-08-31

## 2022-07-07 NOTE — PRE-PROCEDURE INSTRUCTIONS
PREOP INSTRUCTIONS:  No food,milk or milk products for 8 hours before surgery.  Clear liquids like water,gatorade,apple juice are allowed up until 2 hours before surgery.  Instructed to follow the surgeon's instructions if they differ from these.  Shower instructions as well as directions to the [facility] were given.  Encouraged to wear loose fitting,comfortable clothing.  Medication instructions for pm prior to and am of procedure reviewed.  Instructed to avoid taking vitamins,supplements,aspirin and ibuprofen the morning of surgery.    Patient denies any side effects or issues with anesthesia or sedation.     Patient does not know arrival time.Explained that this information comes from the surgeon's office and if they haven't heard from them by 2 or 3 pm to call the office.Patient stated an understanding.

## 2022-07-07 NOTE — TELEPHONE ENCOUNTER
Called pt to confirm arrival time of 845am for procedure on 7/8/2022. Gave pt NPO instructions and gave pt opportunity to ask questions. Pt verbalized understanding.     Pt was informed that only 1 person would be allowed to accompany them the morning of surgery.  Pt verbalized understanding.

## 2022-07-08 ENCOUNTER — HOSPITAL ENCOUNTER (OUTPATIENT)
Facility: HOSPITAL | Age: 47
Discharge: HOME | End: 2022-07-08
Attending: UROLOGY | Admitting: UROLOGY
Payer: OTHER GOVERNMENT

## 2022-07-08 ENCOUNTER — ANESTHESIA (OUTPATIENT)
Dept: SURGERY | Facility: HOSPITAL | Age: 47
End: 2022-07-08
Payer: OTHER GOVERNMENT

## 2022-07-08 ENCOUNTER — PATIENT MESSAGE (OUTPATIENT)
Dept: SURGERY | Facility: HOSPITAL | Age: 47
End: 2022-07-08
Payer: OTHER GOVERNMENT

## 2022-07-08 ENCOUNTER — ANESTHESIA EVENT (OUTPATIENT)
Dept: SURGERY | Facility: HOSPITAL | Age: 47
End: 2022-07-08
Payer: OTHER GOVERNMENT

## 2022-07-08 ENCOUNTER — TELEPHONE (OUTPATIENT)
Dept: UROLOGY | Facility: CLINIC | Age: 47
End: 2022-07-08
Payer: OTHER GOVERNMENT

## 2022-07-08 VITALS
HEIGHT: 66 IN | BODY MASS INDEX: 20.89 KG/M2 | OXYGEN SATURATION: 99 % | TEMPERATURE: 97 F | WEIGHT: 130 LBS | HEART RATE: 70 BPM | DIASTOLIC BLOOD PRESSURE: 72 MMHG | SYSTOLIC BLOOD PRESSURE: 123 MMHG | RESPIRATION RATE: 20 BRPM

## 2022-07-08 DIAGNOSIS — N20.9 UROLITHIASIS: ICD-10-CM

## 2022-07-08 DIAGNOSIS — N20.0 KIDNEY STONE: Primary | ICD-10-CM

## 2022-07-08 PROCEDURE — D9220A PRA ANESTHESIA: ICD-10-PCS | Mod: CRNA,,, | Performed by: NURSE ANESTHETIST, CERTIFIED REGISTERED

## 2022-07-08 PROCEDURE — C1769 GUIDE WIRE: HCPCS | Performed by: UROLOGY

## 2022-07-08 PROCEDURE — 36000706: Performed by: UROLOGY

## 2022-07-08 PROCEDURE — 63600175 PHARM REV CODE 636 W HCPCS: Performed by: STUDENT IN AN ORGANIZED HEALTH CARE EDUCATION/TRAINING PROGRAM

## 2022-07-08 PROCEDURE — 27201423 OPTIME MED/SURG SUP & DEVICES STERILE SUPPLY: Performed by: UROLOGY

## 2022-07-08 PROCEDURE — C1894 INTRO/SHEATH, NON-LASER: HCPCS | Performed by: UROLOGY

## 2022-07-08 PROCEDURE — 37000009 HC ANESTHESIA EA ADD 15 MINS: Performed by: UROLOGY

## 2022-07-08 PROCEDURE — 36000707: Performed by: UROLOGY

## 2022-07-08 PROCEDURE — 63600175 PHARM REV CODE 636 W HCPCS: Performed by: NURSE ANESTHETIST, CERTIFIED REGISTERED

## 2022-07-08 PROCEDURE — 52356 CYSTO/URETERO W/LITHOTRIPSY: CPT | Mod: LT,,, | Performed by: UROLOGY

## 2022-07-08 PROCEDURE — 82365 CALCULUS SPECTROSCOPY: CPT | Performed by: UROLOGY

## 2022-07-08 PROCEDURE — D9220A PRA ANESTHESIA: Mod: CRNA,,, | Performed by: NURSE ANESTHETIST, CERTIFIED REGISTERED

## 2022-07-08 PROCEDURE — D9220A PRA ANESTHESIA: ICD-10-PCS | Mod: ANES,,, | Performed by: ANESTHESIOLOGY

## 2022-07-08 PROCEDURE — 71000016 HC POSTOP RECOV ADDL HR: Performed by: UROLOGY

## 2022-07-08 PROCEDURE — 71000015 HC POSTOP RECOV 1ST HR: Performed by: UROLOGY

## 2022-07-08 PROCEDURE — 25000003 PHARM REV CODE 250: Performed by: UROLOGY

## 2022-07-08 PROCEDURE — D9220A PRA ANESTHESIA: Mod: ANES,,, | Performed by: ANESTHESIOLOGY

## 2022-07-08 PROCEDURE — 71000044 HC DOSC ROUTINE RECOVERY FIRST HOUR: Performed by: UROLOGY

## 2022-07-08 PROCEDURE — C2617 STENT, NON-COR, TEM W/O DEL: HCPCS | Performed by: UROLOGY

## 2022-07-08 PROCEDURE — 52356 PR CYSTO/URETERO W/LITHOTRIPSY: ICD-10-PCS | Mod: LT,,, | Performed by: UROLOGY

## 2022-07-08 PROCEDURE — 00918 ANES TRURL PX URTRL CAL RMVL: CPT | Performed by: UROLOGY

## 2022-07-08 PROCEDURE — 37000008 HC ANESTHESIA 1ST 15 MINUTES: Performed by: UROLOGY

## 2022-07-08 PROCEDURE — 25000003 PHARM REV CODE 250: Performed by: NURSE ANESTHETIST, CERTIFIED REGISTERED

## 2022-07-08 DEVICE — STENT URETERAL UNIV 6FR 26CM: Type: IMPLANTABLE DEVICE | Site: URETER | Status: FUNCTIONAL

## 2022-07-08 RX ORDER — ONDANSETRON 2 MG/ML
INJECTION INTRAMUSCULAR; INTRAVENOUS
Status: DISCONTINUED | OUTPATIENT
Start: 2022-07-08 | End: 2022-07-08

## 2022-07-08 RX ORDER — DEXAMETHASONE SODIUM PHOSPHATE 4 MG/ML
INJECTION, SOLUTION INTRA-ARTICULAR; INTRALESIONAL; INTRAMUSCULAR; INTRAVENOUS; SOFT TISSUE
Status: DISCONTINUED | OUTPATIENT
Start: 2022-07-08 | End: 2022-07-08

## 2022-07-08 RX ORDER — CEFAZOLIN SODIUM/WATER 2 G/20 ML
2 SYRINGE (ML) INTRAVENOUS
Status: COMPLETED | OUTPATIENT
Start: 2022-07-08 | End: 2022-07-08

## 2022-07-08 RX ORDER — FENTANYL CITRATE 50 UG/ML
INJECTION, SOLUTION INTRAMUSCULAR; INTRAVENOUS
Status: DISCONTINUED | OUTPATIENT
Start: 2022-07-08 | End: 2022-07-08

## 2022-07-08 RX ORDER — LIDOCAINE HYDROCHLORIDE 20 MG/ML
INJECTION INTRAVENOUS
Status: DISCONTINUED | OUTPATIENT
Start: 2022-07-08 | End: 2022-07-08

## 2022-07-08 RX ORDER — PROPOFOL 10 MG/ML
VIAL (ML) INTRAVENOUS
Status: DISCONTINUED | OUTPATIENT
Start: 2022-07-08 | End: 2022-07-08

## 2022-07-08 RX ORDER — MIDAZOLAM HYDROCHLORIDE 1 MG/ML
INJECTION INTRAMUSCULAR; INTRAVENOUS
Status: DISCONTINUED | OUTPATIENT
Start: 2022-07-08 | End: 2022-07-08

## 2022-07-08 RX ORDER — PHENAZOPYRIDINE HYDROCHLORIDE 100 MG/1
100 TABLET, FILM COATED ORAL ONCE
Status: COMPLETED | OUTPATIENT
Start: 2022-07-08 | End: 2022-07-08

## 2022-07-08 RX ORDER — SODIUM CHLORIDE 0.9 % (FLUSH) 0.9 %
3 SYRINGE (ML) INJECTION
Status: DISCONTINUED | OUTPATIENT
Start: 2022-07-08 | End: 2022-07-08 | Stop reason: HOSPADM

## 2022-07-08 RX ORDER — TAMSULOSIN HYDROCHLORIDE 0.4 MG/1
0.4 CAPSULE ORAL DAILY
Qty: 30 CAPSULE | Refills: 0 | Status: SHIPPED | OUTPATIENT
Start: 2022-07-08 | End: 2022-08-31

## 2022-07-08 RX ORDER — PHENAZOPYRIDINE HYDROCHLORIDE 100 MG/1
100 TABLET, FILM COATED ORAL
Qty: 30 TABLET | Refills: 0 | Status: SHIPPED | OUTPATIENT
Start: 2022-07-08 | End: 2022-07-18

## 2022-07-08 RX ORDER — ATROPA BELLADONNA AND OPIUM 16.2; 3 MG/1; MG/1
SUPPOSITORY RECTAL
Status: DISCONTINUED | OUTPATIENT
Start: 2022-07-08 | End: 2022-07-08

## 2022-07-08 RX ORDER — ROCURONIUM BROMIDE 10 MG/ML
INJECTION, SOLUTION INTRAVENOUS
Status: DISCONTINUED | OUTPATIENT
Start: 2022-07-08 | End: 2022-07-08

## 2022-07-08 RX ADMIN — SUGAMMADEX 200 MG: 100 INJECTION, SOLUTION INTRAVENOUS at 12:07

## 2022-07-08 RX ADMIN — PROPOFOL 130 MG: 10 INJECTION, EMULSION INTRAVENOUS at 11:07

## 2022-07-08 RX ADMIN — LIDOCAINE HYDROCHLORIDE 80 MG: 20 INJECTION, SOLUTION INTRAVENOUS at 11:07

## 2022-07-08 RX ADMIN — ROCURONIUM BROMIDE 40 MG: 10 INJECTION, SOLUTION INTRAVENOUS at 11:07

## 2022-07-08 RX ADMIN — SODIUM CHLORIDE: 0.9 INJECTION, SOLUTION INTRAVENOUS at 11:07

## 2022-07-08 RX ADMIN — Medication 2 G: at 11:07

## 2022-07-08 RX ADMIN — DEXAMETHASONE SODIUM PHOSPHATE 8 MG: 4 INJECTION INTRA-ARTICULAR; INTRALESIONAL; INTRAMUSCULAR; INTRAVENOUS; SOFT TISSUE at 11:07

## 2022-07-08 RX ADMIN — MIDAZOLAM HYDROCHLORIDE 2 MG: 1 INJECTION, SOLUTION INTRAMUSCULAR; INTRAVENOUS at 11:07

## 2022-07-08 RX ADMIN — ATROPA BELLADONNA AND OPIUM 30 MG: 16.2; 3 SUPPOSITORY RECTAL at 12:07

## 2022-07-08 RX ADMIN — PHENAZOPYRIDINE HYDROCHLORIDE 100 MG: 100 TABLET ORAL at 02:07

## 2022-07-08 RX ADMIN — FENTANYL CITRATE 50 MCG: 50 INJECTION, SOLUTION INTRAMUSCULAR; INTRAVENOUS at 11:07

## 2022-07-08 RX ADMIN — ONDANSETRON 4 MG: 2 INJECTION INTRAMUSCULAR; INTRAVENOUS at 12:07

## 2022-07-08 NOTE — OP NOTE
[facility]  Operative Note    Date: 07/08/2022    Pre-Op Diagnosis: Left ureteral stone, left renal stones  Patient Active Problem List    Diagnosis Date Noted    Left renal stone 06/16/2022    Left ureteral stone 06/16/2022         Post-Op Diagnosis: Same    Procedure(s) Performed:   1.  Left ureteroscopy  2.  Cystoscopy  3.  Laser lithotripsy  4.  Stone basket extraction  5.  Fluoro < 1 h  6.  Pyeloscopy  7.  Stent exchange    Specimen(s): Stone for analysis    Staff Surgeon: Virgie Sprague MD    Assistant Surgeon: MD Huy Martinez MD    Anesthesia: General endotracheal anesthesia    Indications: Sonia Summers is a 46 y.o. female with a left ureteral stone and renal stone, presenting for definitive stone management.  She currently does have a JJ ureteral stent in place.      Findings:   Distal left ureteral stone  Multiple left renal stones  Radiopaque stone fragment remains in lower pole    1 baskets were used throughout the case.      Estimated Blood Loss: min    Drains: 6 Fr x 26 cm JJ ureteral stent with strings    Procedure in detail:  After informed consent was obtained, the patient was brought the the cystoscopy suite and placed in the supine position.  SCDs were applied and working.  Anesthesia was administered.  The patient was then placed in the dorsal lithotomy position and prepped and draped in the usual sterile fashion.      A rigid cystoscope in a 22 Fr sheath was introduced into the patient's urethra.  This passed easily.  The entire urethra was visualized which showed no strictures or masses.  Formal cystoscopy was performed which revealed no masses or lesions suspicious for malignancy, no bladder stones, no bladder diverticuli, no trabeculations.  The ureteral orifices were visualized in the normal anatomic position bilaterally and clear efflux was visualized.      The distal coil of the left ureteral stent was grasped and brought to the meatus. A  motion wire was passed up the left ureteral orifice and up into the kidney.  This passed easily and placement was confirmed using fluoro.  The cystoscope was removed keeping the guidewire in place.    An 8 Fr rigid ureteroscope was passed into the patient's bladder alongside the wire under direct vision.  It was then passed through the left ureteral orifice alongside the wire.  A stone was encountered at the level of distal left ureter.  The stone was grasped with a basket and removed under direct vision. A stiff glide wire was introduced through the ureteroscope and the ureteroscope was removed leaving both wires in place.  A ureteral access sheath was passed over the stiff glide wire and into the proximal ureter. This was performed under live fluoroscopy. The inner dilator and stiff glide wire were removed and a flexible ureteroscope was introduced into the outer sheath.    Multiple stones were encountered at the level of the lower pole and mid pole. A 200 micron laser fiber was passed through the ureteroscope.  The stone was fragmented using the laser.  The laser fiber was removed and a Nitinol tipless basket was introduced through the ureteroscope.  Stone fragments were removed sequentially. There was a lower pole fragment that was unable to be reached with a ureteroscope due to the angle of the UPJ. The ureteral access sheath was removed under direct vision with no evidence of ureteral injury. The ureteroscope was removed keeping the motion wire in place.  The bladder was drained the cystoscope removed keeping the wire in place.      A 6 Fr x 26 cm JJ ureteral stent with strings was passed over the wire and up into the renal pelvis using fluoro.  When the coil appeared to be in good position in the kidney and the radio-opaque marker of the pusher was at the inferior pubis, the wire was removed under continuous fluoro.  Good coils were seen in the kidney and the bladder using fluoro.      The patient tolerated  the procedure well and was transferred to the recovery room in stable condition.      Disposition:  The patient will follow up with Dr. Sprague in 4 weeks with a KUB and renal u/s prior. Patient will remove her ureteral stent on Tuesday morning.      Servando Quigley MD

## 2022-07-08 NOTE — TELEPHONE ENCOUNTER
Spoke with  and requested a callback on Monday to schedule f/u tests due to patient still recovering from procedure.

## 2022-07-08 NOTE — DISCHARGE SUMMARY
[facility]  Discharge Note  Short Stay    Admit Date: 7/8/2022    Discharge Date and Time: 07/08/2022 12:41 PM      Attending Physician: Virgie Sprague MD     Discharge Provider: Servando Quigley    Diagnoses:  Patient Active Problem List    Diagnosis Date Noted    Left renal stone 06/16/2022    Left ureteral stone 06/16/2022         Discharged Condition: good    Hospital Course: Patient was admitted for ureteroscopy and tolerated the procedure well with no complications. The patient was discharged home in good condition on the same day.       Final Diagnoses: Same as principal problem.    Disposition: Home or Self Care    Follow up/Patient Instructions:    Medications:  Reconciled Home Medications:   Current Discharge Medication List      START taking these medications    Details   phenazopyridine (PYRIDIUM) 100 MG tablet Take 1 tablet (100 mg total) by mouth 3 (three) times daily with meals. for 10 days  Qty: 30 tablet, Refills: 0      tamsulosin (FLOMAX) 0.4 mg Cap Take 1 capsule (0.4 mg total) by mouth once daily.  Qty: 30 capsule, Refills: 0         CONTINUE these medications which have NOT CHANGED    Details   ciprofloxacin HCl (CIPRO) 500 MG tablet Take 500 mg by mouth every 12 (twelve) hours.      methenamine hippurate (HIPREX ORAL) Take by mouth.      oxybutynin (DITROPAN) 5 MG Tab Take 1 tablet (5 mg total) by mouth 3 (three) times daily. for 10 days  Qty: 30 tablet, Refills: 0           Discharge Procedure Orders   X-Ray KUB   Standing Status: Future Standing Exp. Date: 07/08/23     Order Specific Question Answer Comments   May the Radiologist modify the order per protocol to meet the clinical needs of the patient? Yes    Release to patient Immediate      US Kidney   Standing Status: Future Standing Exp. Date: 10/08/22     Order Specific Question Answer Comments   May the Radiologist modify the order per protocol to meet the clinical needs of the patient? Yes    Release to patient  Immediate      Notify your health care provider if you experience any of the following:  temperature >100.4     Notify your health care provider if you experience any of the following:  persistent nausea and vomiting or diarrhea     Notify your health care provider if you experience any of the following:  severe uncontrolled pain     Notify your health care provider if you experience any of the following:  difficulty breathing or increased cough     Notify your health care provider if you experience any of the following:  severe persistent headache     Notify your health care provider if you experience any of the following:  persistent dizziness, light-headedness, or visual disturbances     Notify your health care provider if you experience any of the following:  increased confusion or weakness     Other restrictions (specify):   Order Comments: Remove stent with strings on Tuesday morning.      Follow-up Information     Virgie Sprague MD Follow up in 1 month(s).    Specialty: Urology  Why: renal u/s, KUB prior  Contact information:  [address]  [place] [address]  [phone]                         Discharge Procedure Orders (must include Diet, Follow-up, Activity):   Discharge Procedure Orders (must include Diet, Follow-up, Activity)   X-Ray KUB   Standing Status: Future Standing Exp. Date: 07/08/23     Order Specific Question Answer Comments   May the Radiologist modify the order per protocol to meet the clinical needs of the patient? Yes    Release to patient Immediate       Kidney   Standing Status: Future Standing Exp. Date: 10/08/22     Order Specific Question Answer Comments   May the Radiologist modify the order per protocol to meet the clinical needs of the patient? Yes    Release to patient Immediate      Notify your health care provider if you experience any of the following:  temperature >100.4     Notify your health care provider if you experience any of the following:  persistent  nausea and vomiting or diarrhea     Notify your health care provider if you experience any of the following:  severe uncontrolled pain     Notify your health care provider if you experience any of the following:  difficulty breathing or increased cough     Notify your health care provider if you experience any of the following:  severe persistent headache     Notify your health care provider if you experience any of the following:  persistent dizziness, light-headedness, or visual disturbances     Notify your health care provider if you experience any of the following:  increased confusion or weakness     Other restrictions (specify):   Order Comments: Remove stent with strings on Tuesday morning.

## 2022-07-08 NOTE — PLAN OF CARE
Discharge instructions reviewed with pt and pt's  at bedside. Verbalized understanding. Packet given. Meds to be delivered to bedside.

## 2022-07-08 NOTE — TRANSFER OF CARE
"Anesthesia Transfer of Care Note    Patient: Sonia Summers    Procedure(s) Performed: Procedure(s) (LRB):  CYSTOSCOPY  URETEROSCOPY (Left)  REMOVAL, STENT, URETER (Left)  REMOVAL, CALCULUS, URETER, URETEROSCOPIC (Left)  REPLACEMENT, STENT (Left)  PYELOSCOPY  LITHOTRIPSY, USING LASER    Patient location: PACU    Anesthesia Type: general    Transport from OR: Transported from OR on 6-10 L/min O2 by face mask with adequate spontaneous ventilation    Post pain: adequate analgesia    Post assessment: no apparent anesthetic complications and tolerated procedure well    Post vital signs: stable    Level of consciousness: awake and alert    Nausea/Vomiting: no nausea/vomiting    Complications: none    Transfer of care protocol was followed      Last vitals:   Visit Vitals  BP (!) 140/82 (BP Location: Left arm, Patient Position: Lying)   Pulse 89   Temp 36.8 °C (98.2 °F) (Temporal)   Resp 14   Ht 5' 6" (1.676 m)   Wt 59 kg (130 lb)   LMP  (LMP Unknown)   SpO2 100%   Breastfeeding No   BMI 20.98 kg/m²     "

## 2022-07-08 NOTE — INTERVAL H&P NOTE
The patient has been examined and the H&P has been reviewed:    I concur with the findings and no changes have occurred since H&P was written.    Surgery risks, benefits and alternative options discussed and understood by patient/family.    Urine negative for all tested components.    Patient was assessed preoperatively, found to be appropriate in behavior. The risks, benefits, and alternatives to procedures were discussed, and questions were answered. Plan to proceed with described procedure.    Patient Active Problem List    Diagnosis Date Noted    Left renal stone 06/16/2022    Left ureteral stone 06/16/2022           There are no hospital problems to display for this patient.

## 2022-07-08 NOTE — ANESTHESIA PREPROCEDURE EVALUATION
07/08/2022  Sonia Summers is a 46 y.o., female.      Pre-op Assessment    I have reviewed the Patient Summary Reports.       I have reviewed the Medications.     Review of Systems  Anesthesia Hx:  History of prior surgery of interest to airway management or planning:  Denies Personal Hx of Anesthesia complications.   Renal/:   Chronic Renal Disease renal calculi        Physical Exam  General: Well nourished    Airway:  Mallampati: III / II  Mouth Opening: Normal  TM Distance: Normal  Tongue: Normal  Neck ROM: Normal ROM    Chest/Lungs:  Normal Respiratory Rate    Heart:  Rate: Normal        Anesthesia Plan  Type of Anesthesia, risks & benefits discussed:    Anesthesia Type: Gen Natural Airway  Intra-op Monitoring Plan: Standard ASA Monitors  Post Op Pain Control Plan: multimodal analgesia  Induction:  IV  Informed Consent: Informed consent signed with the Patient and all parties understand the risks and agree with anesthesia plan.  All questions answered.   ASA Score: 2  Day of Surgery Review of History & Physical: H&P Update referred to the surgeon/provider.  Anesthesia Plan Notes: NPO confirmed.   Some isolated PONV previously.     Ready For Surgery From Anesthesia Perspective.     .

## 2022-07-08 NOTE — ANESTHESIA POSTPROCEDURE EVALUATION
Anesthesia Post Evaluation    Patient: Sonia Summers    Procedure(s) Performed: Procedure(s) (LRB):  CYSTOSCOPY  URETEROSCOPY (Left)  REMOVAL, STENT, URETER (Left)  REMOVAL, CALCULUS, URETER, URETEROSCOPIC (Left)  REPLACEMENT, STENT (Left)  PYELOSCOPY  LITHOTRIPSY, USING LASER    Final Anesthesia Type: general      Patient location during evaluation: PACU  Patient participation: Yes- Able to Participate  Level of consciousness: awake and alert  Post-procedure vital signs: reviewed and stable  Pain management: adequate  Airway patency: patent    PONV status at discharge: No PONV  Anesthetic complications: no      Cardiovascular status: blood pressure returned to baseline  Respiratory status: unassisted  Hydration status: euvolemic  Follow-up not needed.          Vitals Value Taken Time   /72 07/08/22 1316   Temp 36.3 °C (97.3 °F) 07/08/22 1255   Pulse 79 07/08/22 1326   Resp 12 07/08/22 1326   SpO2 100 % 07/08/22 1326   Vitals shown include unvalidated device data.      No case tracking events are documented in the log.      Pain/Palmer Score: Palmer Score: 9 (7/8/2022  1:15 PM)

## 2022-07-12 LAB
COMPN STONE: NORMAL
SPECIMEN SOURCE: NORMAL
STONE ANALYSIS IR-IMP: NORMAL

## 2022-07-20 ENCOUNTER — TELEPHONE (OUTPATIENT)
Dept: UROLOGY | Facility: CLINIC | Age: 47
End: 2022-07-20
Payer: OTHER GOVERNMENT

## 2022-07-20 NOTE — TELEPHONE ENCOUNTER
I spoke with patient, who stated she is doing well and has stopped all meds related to her kidney stones.

## 2022-07-21 ENCOUNTER — TELEPHONE (OUTPATIENT)
Dept: UROLOGY | Facility: CLINIC | Age: 47
End: 2022-07-21
Payer: OTHER GOVERNMENT

## 2022-08-08 ENCOUNTER — HOSPITAL ENCOUNTER (OUTPATIENT)
Dept: RADIOLOGY | Facility: HOSPITAL | Age: 47
Discharge: HOME | End: 2022-08-08
Attending: STUDENT IN AN ORGANIZED HEALTH CARE EDUCATION/TRAINING PROGRAM
Payer: OTHER GOVERNMENT

## 2022-08-08 DIAGNOSIS — N20.0 KIDNEY STONE: ICD-10-CM

## 2022-08-08 PROCEDURE — 76770 US EXAM ABDO BACK WALL COMP: CPT | Mod: 26,,, | Performed by: RADIOLOGY

## 2022-08-08 PROCEDURE — 76770 US KIDNEY: ICD-10-PCS | Mod: 26,,, | Performed by: RADIOLOGY

## 2022-08-08 PROCEDURE — 74018 RADEX ABDOMEN 1 VIEW: CPT | Mod: 26,,, | Performed by: RADIOLOGY

## 2022-08-08 PROCEDURE — 74018 RADEX ABDOMEN 1 VIEW: CPT | Mod: TC

## 2022-08-08 PROCEDURE — 74018 XR KUB: ICD-10-PCS | Mod: 26,,, | Performed by: RADIOLOGY

## 2022-08-08 PROCEDURE — 76770 US EXAM ABDO BACK WALL COMP: CPT | Mod: TC

## 2022-08-31 ENCOUNTER — OFFICE VISIT (OUTPATIENT)
Dept: UROLOGY | Facility: CLINIC | Age: 47
End: 2022-08-31
Payer: OTHER GOVERNMENT

## 2022-08-31 VITALS
WEIGHT: 127.19 LBS | SYSTOLIC BLOOD PRESSURE: 119 MMHG | DIASTOLIC BLOOD PRESSURE: 80 MMHG | BODY MASS INDEX: 20.44 KG/M2 | HEIGHT: 66 IN | HEART RATE: 76 BPM

## 2022-08-31 DIAGNOSIS — N20.0 LEFT RENAL STONE: Primary | ICD-10-CM

## 2022-08-31 PROCEDURE — 99214 PR OFFICE/OUTPT VISIT, EST, LEVL IV, 30-39 MIN: ICD-10-PCS | Mod: S$PBB,,, | Performed by: UROLOGY

## 2022-08-31 PROCEDURE — 99999 PR PBB SHADOW E&M-EST. PATIENT-LVL IV: CPT | Mod: PBBFAC,,, | Performed by: UROLOGY

## 2022-08-31 PROCEDURE — 99999 PR PBB SHADOW E&M-EST. PATIENT-LVL IV: ICD-10-PCS | Mod: PBBFAC,,, | Performed by: UROLOGY

## 2022-08-31 PROCEDURE — 99214 OFFICE O/P EST MOD 30 MIN: CPT | Mod: PBBFAC | Performed by: UROLOGY

## 2022-08-31 PROCEDURE — 99214 OFFICE O/P EST MOD 30 MIN: CPT | Mod: S$PBB,,, | Performed by: UROLOGY

## 2022-08-31 RX ORDER — CYCLOSPORINE 0.5 MG/ML
EMULSION OPHTHALMIC
COMMUNITY
Start: 2022-07-29

## 2022-08-31 NOTE — PATIENT INSTRUCTIONS
Needs referral   [phone]   Dr. Colon (nephrology)    24 hour urine.   Discard first morning urine - document time and collect every urine for a full 24 hours (until documented time the next day)    Return to clinic in 3 months with KUB Xray before to assess kidney stone

## 2022-08-31 NOTE — PROGRESS NOTES
Urology - [facility]  Clinic Note    SUBJECTIVE:     Chief Complaint: Urolithiasis follow up    History of Present Illness:  Sonia Summers is a 46 y.o. female who presents after left ureteroscopy 7/8/22. Initially presented to the ED and received Left ureteral stent 6/17/22.  Stone was 90% calcium phosphate. Residual 6mm lower pole not attainable with flexible ureteroscopy. HF 1500.   7/28/22  1.  Left ureteroscopy  2.  Cystoscopy  3.  Laser lithotripsy  4.  Stone basket extraction  5.  Fluoro < 1 h  6.  Pyeloscopy  7.  Stent exchange     Specimen(s): Stone for analysis     Staff Surgeon: Virgie Sprague MD     Assistant Surgeon: MD Huy Martinez MD     Anesthesia: General endotracheal anesthesia     Indications: Sonia Summers is a 46 y.o. female with a left ureteral stone and renal stone, presenting for definitive stone management.  She currently does have a JJ ureteral stent in place.       Findings:   Distal left ureteral stone  Multiple left renal stones  Radiopaque stone fragment remains in lower pole  Urolithiasis  Patient reports resolution of left flank pain. Patient reports no further nausea, vomiting, fevers, hematuria, dysuria. Risk factors for urolithiasis: cola soft drink intake and history of stone disease.    Repeat KUB 8/8/22 shows persistence of 7 mm lower left kidney stone. Renal US with resolution of hydroureteronephrosis.      PATIENT HISTORY:  History reviewed. No pertinent past medical history.  Past Surgical History:   Procedure Laterality Date    CYSTOSCOPY  7/8/2022    Procedure: CYSTOSCOPY;  Surgeon: Virgie Sprague MD;  [facility] [facility] [place] [address] [place] [address];  Service: Urology;;    CYSTOSCOPY W/ URETERAL STENT PLACEMENT Left 6/17/2022    Procedure: CYSTOSCOPY, WITH URETERAL STENT INSERTION;  Surgeon: Jazz Wood MD;  [place] [facility] [place] [address] [place] [address];  Service: Urology;  Laterality: Left;    LASER LITHOTRIPSY  7/8/2022    Procedure: LITHOTRIPSY, USING LASER;   Surgeon: Virgie Sprague MD;  Location: [facility] OR [address] [place] [address];  Service: Urology;;    PYELOSCOPY  7/8/2022    Procedure: PYELOSCOPY;  Surgeon: Virgie Sprague MD;  [facility];  Service: Urology;;    REPLACEMENT OF STENT Left 7/8/2022    Procedure: REPLACEMENT, STENT;  Surgeon: Virgie Sprague MD;  Location: [facility] OR [address] [place] [address];  Service: Urology;  Laterality: Left;    URETEROSCOPIC REMOVAL OF URETERIC CALCULUS Left 7/8/2022    Procedure: REMOVAL, CALCULUS, URETER, URETEROSCOPIC;  Surgeon: Virgie Sprague MD;  Location: [facility] OR [address] [place] [address];  Service: Urology;  Laterality: Left;    URETEROSCOPY Left 7/8/2022    Procedure: URETEROSCOPY;  Surgeon: Virgie Sprague MD;  Location: [facility] OR [address] [place] [address];  Service: Urology;  Laterality: Left;     History reviewed. No pertinent family history.  Social History     Socioeconomic History    Marital status:    Tobacco Use    Smoking status: Never    Smokeless tobacco: Never   Substance and Sexual Activity    Alcohol use: Yes     Comment: Daily wine    Drug use: Not Currently       Medications:  Outpatient Encounter Medications as of 8/31/2022   Medication Sig Dispense Refill    methenamine hippurate (HIPREX ORAL) Take by mouth.      RESTASIS 0.05 % ophthalmic emulsion       [DISCONTINUED] ciprofloxacin HCl (CIPRO) 500 MG tablet Take 500 mg by mouth every 12 (twelve) hours.      [DISCONTINUED] oxybutynin (DITROPAN) 5 MG Tab Take 1 tablet (5 mg total) by mouth 3 (three) times daily. for 10 days 30 tablet 0    [DISCONTINUED] tamsulosin (FLOMAX) 0.4 mg Cap Take 1 capsule (0.4 mg total) by mouth once daily. 30 capsule 0     No facility-administered encounter medications on file as of 8/31/2022.     Allergies:  Sulfa (sulfonamide antibiotics)    Review of Systems:  Review of Systems   Constitutional:  Negative for chills, fever and weight loss.   HENT: Negative.     Eyes: Negative.    Respiratory: Negative.     Cardiovascular:  Negative for chest  "pain.   Gastrointestinal:  Negative for constipation, diarrhea, nausea and vomiting.   Genitourinary:  Negative for dysuria, frequency, hematuria and urgency.   Musculoskeletal: Negative.    Skin: Negative.    Neurological: Negative.    Psychiatric/Behavioral: Negative.       OBJECTIVE:     Estimated body mass index is 20.53 kg/m² as calculated from the following:    Height as of this encounter: 5' 6" (1.676 m).    Weight as of this encounter: 57.7 kg (127 lb 3.3 oz).    Vital Signs (Most Recent)  Pulse: 76 (08/31/22 1344)  BP: 119/80 (08/31/22 1344)    Physical Exam  Vitals and nursing note reviewed.   Constitutional:       General: She is not in acute distress.     Appearance: Normal appearance.   HENT:      Head: Atraumatic.      Nose: Nose normal.   Eyes:      Extraocular Movements: Extraocular movements intact.   Cardiovascular:      Rate and Rhythm: Normal rate.   Pulmonary:      Effort: Pulmonary effort is normal.   Abdominal:      General: Abdomen is flat. There is no distension.      Tenderness: There is no abdominal tenderness. There is no right CVA tenderness or left CVA tenderness.   Musculoskeletal:         General: Normal range of motion.      Cervical back: Normal range of motion.   Skin:     Coloration: Skin is not jaundiced.   Neurological:      General: No focal deficit present.      Mental Status: She is alert and oriented to person, place, and time.   Psychiatric:         Mood and Affect: Mood normal.         Behavior: Behavior normal.     Labs:  Lab Results   Component Value Date    BUN 9 06/17/2022    CREATININE 0.7 06/17/2022    WBC 6.07 06/17/2022    HGB 11.5 (L) 06/17/2022    HCT 34.8 (L) 06/17/2022     06/17/2022    AST 22 06/16/2022    ALT 16 06/16/2022    ALKPHOS 71 06/16/2022    ALBUMIN 4.2 06/16/2022          Imaging:  KUB 8/8/22: 7 mm left lower pole kidney stone.    ASSESSMENT     1. Left renal stone        PLAN:   Sonia was seen today for nephrolithiasis.    Diagnoses and " all orders for this visit:    Left renal stone  General risk factors for kidney stones and the conservative measures to prevent kidney stones in the future were discussed with the patient in detail.  The patient was encouraged to drink 2-3 liters of water a day, limit iced tea and laura as well as foods high in oxalate.  They were cautioned to try to limit salt and red meat intake.  We also discussed adding citrate to the diet with the addition of jon or lemon juice to their water or alternatively with crystal light.   - Discussed options with patient including observation, ESWL, PCNL and ureteroscopy  - Patient would like to pursue surveillance at this time  - Ureteroscopy unlikely to be able to treat stone. ESWL may be less effective due to density  - Discussed precautions to return to the ED    - PCP will need to refer to Dr. Colon  - 24-hr urine test. Litholink ordered  - RTC 3 months with KUB prior      Roberth Anderson MD     Letter to Keyonna Beckford MD,     Needs referral from PCP due to insurance  [phone]   Dr. Colon (nephrology)    Dr. Sprague saw patient and agrees with treatment, evaluation and plan

## 2022-08-31 NOTE — LETTER
September 1, 2022        Angela Colon MD  [address] [address]             [facility] [address]  [address]  [place] [address]  Phone: [phone]   Patient: Sonia Summers   MR Number: [id number]   YOB: 1975   Date of Visit: 8/31/2022       Dear Dr. Colon:    Thank you for referring Sonia Summers to me for evaluation. Attached you will find relevant portions of my assessment and plan of care.    If you have questions, please do not hesitate to call me. I look forward to following Sonia Summers along with you.    Sincerely,      Virgie Sprague MD            CC    No Recipients    Enclosure

## 2022-09-19 ENCOUNTER — PATIENT MESSAGE (OUTPATIENT)
Dept: UROLOGY | Facility: CLINIC | Age: 47
End: 2022-09-19
Payer: OTHER GOVERNMENT

## 2022-09-19 DIAGNOSIS — N20.0 LEFT RENAL STONE: Primary | ICD-10-CM

## 2022-09-26 ENCOUNTER — PATIENT MESSAGE (OUTPATIENT)
Dept: UROLOGY | Facility: CLINIC | Age: 47
End: 2022-09-26
Payer: OTHER GOVERNMENT

## 2022-11-04 ENCOUNTER — PATIENT MESSAGE (OUTPATIENT)
Dept: UROLOGY | Facility: CLINIC | Age: 47
End: 2022-11-04
Payer: OTHER GOVERNMENT

## 2022-11-04 DIAGNOSIS — N20.0 LEFT RENAL STONE: Primary | ICD-10-CM

## 2022-11-21 ENCOUNTER — HOSPITAL ENCOUNTER (OUTPATIENT)
Dept: RADIOLOGY | Facility: HOSPITAL | Age: 47
Discharge: HOME | End: 2022-11-21
Attending: UROLOGY
Payer: OTHER GOVERNMENT

## 2022-11-21 DIAGNOSIS — N20.0 LEFT RENAL STONE: ICD-10-CM

## 2022-11-21 PROCEDURE — 76770 US KIDNEY: ICD-10-PCS | Mod: 26,,, | Performed by: RADIOLOGY

## 2022-11-21 PROCEDURE — 76770 US EXAM ABDO BACK WALL COMP: CPT | Mod: TC

## 2022-11-21 PROCEDURE — 76770 US EXAM ABDO BACK WALL COMP: CPT | Mod: 26,,, | Performed by: RADIOLOGY

## 2022-11-30 ENCOUNTER — HOSPITAL ENCOUNTER (OUTPATIENT)
Dept: RADIOLOGY | Facility: HOSPITAL | Age: 47
Discharge: HOME | End: 2022-11-30
Attending: UROLOGY
Payer: OTHER GOVERNMENT

## 2022-11-30 DIAGNOSIS — N20.0 LEFT RENAL STONE: ICD-10-CM

## 2022-11-30 PROCEDURE — 74018 RADEX ABDOMEN 1 VIEW: CPT | Mod: TC

## 2022-11-30 PROCEDURE — 74018 RADEX ABDOMEN 1 VIEW: CPT | Mod: 26,,, | Performed by: INTERNAL MEDICINE

## 2022-11-30 PROCEDURE — 74018 XR ABDOMEN AP 1 VIEW: ICD-10-PCS | Mod: 26,,, | Performed by: INTERNAL MEDICINE

## 2022-12-12 DIAGNOSIS — N20.0 LEFT RENAL STONE: Primary | ICD-10-CM

## 2023-03-27 ENCOUNTER — TELEPHONE (OUTPATIENT)
Dept: UROLOGY | Facility: CLINIC | Age: 48
End: 2023-03-27
Payer: OTHER GOVERNMENT

## 2023-04-05 ENCOUNTER — PATIENT MESSAGE (OUTPATIENT)
Dept: UROLOGY | Facility: CLINIC | Age: 48
End: 2023-04-05
Payer: OTHER GOVERNMENT

## 2023-04-10 ENCOUNTER — HOSPITAL ENCOUNTER (OUTPATIENT)
Dept: RADIOLOGY | Facility: HOSPITAL | Age: 48
Discharge: HOME | End: 2023-04-10
Attending: UROLOGY
Payer: OTHER GOVERNMENT

## 2023-04-10 DIAGNOSIS — N20.0 LEFT RENAL STONE: ICD-10-CM

## 2023-04-10 PROCEDURE — 74018 XR ABDOMEN AP 1 VIEW: ICD-10-PCS | Mod: 26,,, | Performed by: RADIOLOGY

## 2023-04-10 PROCEDURE — 74018 RADEX ABDOMEN 1 VIEW: CPT | Mod: 26,,, | Performed by: RADIOLOGY

## 2023-04-10 PROCEDURE — 76770 US EXAM ABDO BACK WALL COMP: CPT | Mod: 26,,, | Performed by: STUDENT IN AN ORGANIZED HEALTH CARE EDUCATION/TRAINING PROGRAM

## 2023-04-10 PROCEDURE — 76770 US KIDNEY: ICD-10-PCS | Mod: 26,,, | Performed by: STUDENT IN AN ORGANIZED HEALTH CARE EDUCATION/TRAINING PROGRAM

## 2023-04-10 PROCEDURE — 76770 US EXAM ABDO BACK WALL COMP: CPT | Mod: TC

## 2023-04-10 PROCEDURE — 74018 RADEX ABDOMEN 1 VIEW: CPT | Mod: TC

## 2023-04-18 ENCOUNTER — OFFICE VISIT (OUTPATIENT)
Dept: UROLOGY | Facility: CLINIC | Age: 48
End: 2023-04-18
Payer: OTHER GOVERNMENT

## 2023-04-18 VITALS
BODY MASS INDEX: 20.73 KG/M2 | HEART RATE: 74 BPM | HEIGHT: 66 IN | DIASTOLIC BLOOD PRESSURE: 76 MMHG | WEIGHT: 129 LBS | SYSTOLIC BLOOD PRESSURE: 127 MMHG

## 2023-04-18 DIAGNOSIS — N20.0 LEFT RENAL STONE: Primary | ICD-10-CM

## 2023-04-18 PROCEDURE — 99213 OFFICE O/P EST LOW 20 MIN: CPT | Mod: S$PBB,,, | Performed by: UROLOGY

## 2023-04-18 PROCEDURE — 99999 PR PBB SHADOW E&M-EST. PATIENT-LVL III: ICD-10-PCS | Mod: PBBFAC,,, | Performed by: UROLOGY

## 2023-04-18 PROCEDURE — 99213 PR OFFICE/OUTPT VISIT, EST, LEVL III, 20-29 MIN: ICD-10-PCS | Mod: S$PBB,,, | Performed by: UROLOGY

## 2023-04-18 PROCEDURE — 99213 OFFICE O/P EST LOW 20 MIN: CPT | Mod: PBBFAC | Performed by: UROLOGY

## 2023-04-18 PROCEDURE — 99999 PR PBB SHADOW E&M-EST. PATIENT-LVL III: CPT | Mod: PBBFAC,,, | Performed by: UROLOGY

## 2023-04-18 NOTE — PROGRESS NOTES
Urology - [facility]  Clinic Note    SUBJECTIVE:     Chief Complaint: Urolithiasis follow up    History of Present Illness:  Sonia Summers is a 47 y.o. female who presents after left ureteroscopy 7/8/22. Initially presented to the ED and received Left ureteral stent 6/17/22.  Stone was 90% calcium phosphate. Residual 6mm lower pole not attainable with flexible ureteroscopy. HF 1500.   She is seeing Dr. Stovall in nephrology.     4/10/23  Right kidney: The right kidney measures 12.1 cm. No cortical thinning. No loss of corticomedullary distinction. Resistive index measures 0.65.  No mass. No renal stone. Extrarenal pelvis.  No hydronephrosis.     Left kidney: The left kidney measures 12.4 cm. No cortical thinning. No loss of corticomedullary distinction. Resistive index measures 0.67.  No mass. Three nonobstructing stones within the inferior pole ranging from 2-5 mm.  No hydronephrosis.     Impression:     Nonobstructing left renal stones.    7/28/22  1.  Left ureteroscopy  2.  Cystoscopy  3.  Laser lithotripsy  4.  Stone basket extraction  5.  Fluoro < 1 h  6.  Pyeloscopy  7.  Stent exchange     Specimen(s): Stone for analysis     Staff Surgeon: Virgie Sprague MD     Assistant Surgeon: MD Huy Martinez MD     Anesthesia: General endotracheal anesthesia     Indications: Sonia Summers is a 46 y.o. female with a left ureteral stone and renal stone, presenting for definitive stone management.  She currently does have a JJ ureteral stent in place.       Findings:   Distal left ureteral stone  Multiple left renal stones  Radiopaque stone fragment remains in lower pole    Urolithiasis  Patient reports resolution of left flank pain. Patient reports no further nausea, vomiting, fevers, hematuria, dysuria. Risk factors for urolithiasis: cola soft drink intake and history of stone disease.    Repeat KUB 8/8/22 shows persistence of 7 mm lower left kidney stone. Renal US with resolution of  hydroureteronephrosis.      PATIENT HISTORY:  No past medical history on file.  Past Surgical History:   Procedure Laterality Date    CYSTOSCOPY  7/8/2022    Procedure: CYSTOSCOPY;  Surgeon: Virgie Sprague MD;  Location: [facility] OR [address] [place] [address];  Service: Urology;;    CYSTOSCOPY W/ URETERAL STENT PLACEMENT Left 6/17/2022    Procedure: CYSTOSCOPY, WITH URETERAL STENT INSERTION;  Surgeon: Jazz Wood MD;  [facility];  Service: Urology;  Laterality: Left;    LASER LITHOTRIPSY  7/8/2022    Procedure: LITHOTRIPSY, USING LASER;  Surgeon: Virgie Sprague MD;  [facility];  Service: Urology;;    PYELOSCOPY  7/8/2022    Procedure: PYELOSCOPY;  Surgeon: Virgie Sprague MD;  [facility];  Service: Urology;;    REPLACEMENT OF STENT Left 7/8/2022    Procedure: REPLACEMENT, STENT;  Surgeon: Virgie Sprague MD;  Location: [facility] OR [address] [place] [address];  Service: Urology;  Laterality: Left;    URETEROSCOPIC REMOVAL OF URETERIC CALCULUS Left 7/8/2022    Procedure: REMOVAL, CALCULUS, URETER, URETEROSCOPIC;  Surgeon: Virgie Sprague MD;  Location: [facility] OR [address] [place] [address];  Service: Urology;  Laterality: Left;    URETEROSCOPY Left 7/8/2022    Procedure: URETEROSCOPY;  Surgeon: Virgie Sprague MD;  Location: [facility] OR [address] [place] [address];  Service: Urology;  Laterality: Left;     No family history on file.  Social History     Socioeconomic History    Marital status:    Tobacco Use    Smoking status: Never    Smokeless tobacco: Never   Substance and Sexual Activity    Alcohol use: Yes     Comment: Daily wine    Drug use: Not Currently       Medications:  Outpatient Encounter Medications as of 4/18/2023   Medication Sig Dispense Refill    methenamine hippurate (HIPREX ORAL) Take by mouth.      RESTASIS 0.05 % ophthalmic emulsion        No facility-administered encounter medications on file as of 4/18/2023.     Allergies:  Sulfa (sulfonamide antibiotics)    Review of Systems:  Review  "of Systems   Eyes: Negative.      OBJECTIVE:     Estimated body mass index is 20.53 kg/m² as calculated from the following:    Height as of 8/31/22: 5' 6" (1.676 m).    Weight as of 8/31/22: 57.7 kg (127 lb 3.3 oz).    Vital Signs (Most Recent)       Physical Exam  Vitals and nursing note reviewed.   Constitutional:       General: She is not in acute distress.     Appearance: Normal appearance.   Abdominal:      Tenderness: There is no right CVA tenderness or left CVA tenderness.   Neurological:      Mental Status: She is alert.     Labs:  Lab Results   Component Value Date    BUN 9 06/17/2022    CREATININE 0.7 06/17/2022    WBC 6.07 06/17/2022    HGB 11.5 (L) 06/17/2022    HCT 34.8 (L) 06/17/2022     06/17/2022    AST 22 06/16/2022    ALT 16 06/16/2022    ALKPHOS 71 06/16/2022    ALBUMIN 4.2 06/16/2022          Imaging:  KUB 8/8/22: 7 mm left lower pole kidney stone.    4/10/23 ultrasound  FINDINGS:  Right kidney: The right kidney measures 12.1 cm. No cortical thinning. No loss of corticomedullary distinction. Resistive index measures 0.65.  No mass. No renal stone. Extrarenal pelvis.  No hydronephrosis.     Left kidney: The left kidney measures 12.4 cm. No cortical thinning. No loss of corticomedullary distinction. Resistive index measures 0.67.  No mass. Three nonobstructing stones within the inferior pole ranging from 2-5 mm.  No hydronephrosis.     Impression:     Nonobstructing left renal stones.    Kub 4/10/23    FINDINGS:  Left-sided nephrolithiasis similar to the previous study.  IUD in the pelvis.  No significant bowel dilatation.  ASSESSMENT     1. Left renal stone        PLAN:   Sonia was seen today for follow-up.    Diagnoses and all orders for this visit:    Left renal stone    Continue follow up with nephrology. Stones stable on imaging. The 5mm fragment was not retrievable on last ureteroscopy.   F/u 1 year with kub/ultrasound - she might just do this with nephrology.   Virgie ZAMORANO" MD [place]     Letter to Keyonna Beckford MD,

## 2023-06-27 ENCOUNTER — PATIENT MESSAGE (OUTPATIENT)
Dept: UROLOGY | Facility: CLINIC | Age: 48
End: 2023-06-27
Payer: OTHER GOVERNMENT

## 2023-06-29 ENCOUNTER — HOSPITAL ENCOUNTER (OUTPATIENT)
Dept: RADIOLOGY | Facility: HOSPITAL | Age: 48
Discharge: HOME | End: 2023-06-29
Payer: OTHER GOVERNMENT

## 2023-06-29 ENCOUNTER — OFFICE VISIT (OUTPATIENT)
Dept: UROLOGY | Facility: CLINIC | Age: 48
End: 2023-06-29
Payer: OTHER GOVERNMENT

## 2023-06-29 VITALS
SYSTOLIC BLOOD PRESSURE: 126 MMHG | BODY MASS INDEX: 21.62 KG/M2 | HEIGHT: 66 IN | HEART RATE: 69 BPM | WEIGHT: 134.5 LBS | DIASTOLIC BLOOD PRESSURE: 77 MMHG

## 2023-06-29 DIAGNOSIS — R30.0 DYSURIA: Primary | ICD-10-CM

## 2023-06-29 DIAGNOSIS — R10.9 FLANK PAIN, ACUTE: ICD-10-CM

## 2023-06-29 PROCEDURE — 99999 PR PBB SHADOW E&M-EST. PATIENT-LVL IV: ICD-10-PCS | Mod: PBBFAC,,,

## 2023-06-29 PROCEDURE — 76770 US EXAM ABDO BACK WALL COMP: CPT | Mod: 26,,, | Performed by: RADIOLOGY

## 2023-06-29 PROCEDURE — 76770 US KIDNEY: ICD-10-PCS | Mod: 26,,, | Performed by: RADIOLOGY

## 2023-06-29 PROCEDURE — 99999 PR PBB SHADOW E&M-EST. PATIENT-LVL IV: CPT | Mod: PBBFAC,,,

## 2023-06-29 PROCEDURE — 99214 OFFICE O/P EST MOD 30 MIN: CPT | Mod: PBBFAC

## 2023-06-29 PROCEDURE — 99214 PR OFFICE/OUTPT VISIT, EST, LEVL IV, 30-39 MIN: ICD-10-PCS | Mod: S$PBB,,,

## 2023-06-29 PROCEDURE — 87086 URINE CULTURE/COLONY COUNT: CPT

## 2023-06-29 PROCEDURE — 99214 OFFICE O/P EST MOD 30 MIN: CPT | Mod: S$PBB,,,

## 2023-06-29 PROCEDURE — 76770 US EXAM ABDO BACK WALL COMP: CPT | Mod: TC

## 2023-06-29 RX ORDER — KETOROLAC TROMETHAMINE 10 MG/1
10 TABLET, FILM COATED ORAL EVERY 6 HOURS
Qty: 20 TABLET | Refills: 0 | Status: SHIPPED | OUTPATIENT
Start: 2023-06-29 | End: 2023-07-04

## 2023-06-29 RX ORDER — CHOLECALCIFEROL (VITAMIN D3) 25 MCG
1000 TABLET ORAL
COMMUNITY

## 2023-06-29 RX ORDER — HYDROCHLOROTHIAZIDE 12.5 MG/1
12.5 CAPSULE ORAL
COMMUNITY
Start: 2023-05-02

## 2023-06-29 NOTE — PROGRESS NOTES
CHIEF COMPLAINT:  L flank pain      HISTORY OF PRESENTING ILLINESS:  Sonia Summers is a 47 y.o. female established to urology. Presents today c/o left sided flank pain x 4 days. It is constant and severity depends on body position. The L flank pain radiates to the left side and abdomen. She underwent left ureteroscopy 7/8/2022 with Dr. Sprague. Initially presented to the ED and received Left ureteral stent 6/17/22.   Stone was 90% calcium phosphate. Residual 6mm lower pole not attainable with flexible ureteroscopy.   She is seeing Dr. Stovall in nephrology.      4/10/23  Right kidney: The right kidney measures 12.1 cm. No cortical thinning. No loss of corticomedullary distinction. Resistive index measures 0.65.  No mass. No renal stone. Extrarenal pelvis.  No hydronephrosis.     Left kidney: The left kidney measures 12.4 cm. No cortical thinning. No loss of corticomedullary distinction. Resistive index measures 0.67.  No mass. Three nonobstructing stones within the inferior pole ranging from 2-5 mm.  No hydronephrosis.          REVIEW OF SYSTEMS:  Review of Systems   Constitutional:  Negative for chills and fever.   HENT:  Negative for congestion and sore throat.    Respiratory:  Negative for cough and shortness of breath.    Cardiovascular:  Negative for chest pain and palpitations.   Gastrointestinal:  Negative for nausea and vomiting.   Genitourinary:  Positive for flank pain (L). Negative for dysuria, frequency, hematuria and urgency.   Neurological:  Negative for dizziness and headaches.       PATIENT HISTORY:    No past medical history on file.    Past Surgical History:   Procedure Laterality Date    CYSTOSCOPY  7/8/2022    Procedure: CYSTOSCOPY;  Surgeon: Virgie Sprague MD;  Location: [facility] OR [address] [place] [address];  Service: Urology;;    CYSTOSCOPY W/ URETERAL STENT PLACEMENT Left 6/17/2022    Procedure: CYSTOSCOPY, WITH URETERAL STENT INSERTION;  Surgeon: Jazz Wood MD;  Location: [facility] OR [address] [place] [address];   Service: Urology;  Laterality: Left;    LASER LITHOTRIPSY  7/8/2022    Procedure: LITHOTRIPSY, USING LASER;  Surgeon: Virgie Sprague MD;  [facility];  Service: Urology;;    PYELOSCOPY  7/8/2022    Procedure: PYELOSCOPY;  Surgeon: Virgie Sprague MD;  [facility];  Service: Urology;;    REPLACEMENT OF STENT Left 7/8/2022    Procedure: REPLACEMENT, STENT;  Surgeon: Virgie Sprague MD;  [facility];  Service: Urology;  Laterality: Left;    URETEROSCOPIC REMOVAL OF URETERIC CALCULUS Left 7/8/2022    Procedure: REMOVAL, CALCULUS, URETER, URETEROSCOPIC;  Surgeon: Virgie Sprague MD;  [facility];  Service: Urology;  Laterality: Left;    URETEROSCOPY Left 7/8/2022    Procedure: URETEROSCOPY;  Surgeon: Virgie Sprague MD;  Location: [facility] OR [address] [place] [address];  Service: Urology;  Laterality: Left;       No family history on file.    Social History     Socioeconomic History    Marital status:    Tobacco Use    Smoking status: Never    Smokeless tobacco: Never   Substance and Sexual Activity    Alcohol use: Yes     Comment: Daily wine    Drug use: Not Currently       Allergies:  Sulfa (sulfonamide antibiotics)    Medications:    Current Outpatient Medications:     hydroCHLOROthiazide (MICROZIDE) 12.5 mg capsule, Take 12.5 mg by mouth., Disp: , Rfl:     RESTASIS 0.05 % ophthalmic emulsion, , Disp: , Rfl:     vitamin D (VITAMIN D3) 1000 units Tab, Take 1,000 Units by mouth., Disp: , Rfl:     ketorolac (TORADOL) 10 mg tablet, Take 1 tablet (10 mg total) by mouth every 6 (six) hours. for 5 days, Disp: 20 tablet, Rfl: 0    methenamine hippurate (HIPREX ORAL), Take by mouth., Disp: , Rfl:     PHYSICAL EXAMINATION:  Physical Exam  Constitutional:       Appearance: Normal appearance.   HENT:      Head: Normocephalic and atraumatic.      Right Ear: External ear normal.      Left Ear: External ear normal.   Pulmonary:      Effort: Pulmonary effort  is normal. No respiratory distress.   Abdominal:      Tenderness: There is no right CVA tenderness or left CVA tenderness.   Skin:     General: Skin is warm and dry.   Neurological:      General: No focal deficit present.      Mental Status: She is alert and oriented to person, place, and time.   Psychiatric:         Mood and Affect: Mood normal.         Behavior: Behavior normal.         LABS:  UA clean catch dip: 50 blood, otherwise normal    Lab Results   Component Value Date    CREATININE 0.7 06/17/2022             IMPRESSION:    Encounter Diagnoses   Name Primary?    Dysuria Yes    Flank pain, acute          Assessment:       1. Dysuria    2. Flank pain, acute        Plan:   - PRN rx Toradol sent to pharmacy of choice. Instructed to take with food to decrease stomach upset. If pain not relieved with Toradol recommended ED.   - Renal US to assess for stone migration   - Urine sent for culture to rule out infection  - Stone instructions provided as well as labeled specimen cup and strainer    Follow up dependent on imaging and urine results     I spent 30 minutes with the patient of which more than half was spent in direct consultation with the patient in regards to our treatment and plan.

## 2023-06-29 NOTE — PATIENT INSTRUCTIONS
Drink 2-3 liters of water daily  Continue Flomax  Continue Toradol as needed.   Strain every urine. Strainer provided.    Patient instructed to bring in stone for stone analysis.  Sterile cup provided.    Will repeat CT RSS if patient has not passed stone in 2 weeks.  Informed patient that CT RSS will be canceled if he passes the stone prior to the visit.       Get prompt medical attention if any of the following occur:  Severe pain that returns and not relieved by pain medicines  Repeated vomiting or unable to keep down fluids  Weakness, dizziness or fainting  Fever of 100.4ºF (38ºC) or higher, or as directed by your healthcare provider  Blood clots in urine  Foul smelling or cloudy urine  Unable to pass urine for 8 hours or increasing bladder pressure

## 2023-06-30 ENCOUNTER — PATIENT MESSAGE (OUTPATIENT)
Dept: UROLOGY | Facility: CLINIC | Age: 48
End: 2023-06-30
Payer: OTHER GOVERNMENT

## 2023-06-30 LAB
BACTERIA UR CULT: NORMAL
BACTERIA UR CULT: NORMAL

## 2023-07-01 ENCOUNTER — PATIENT MESSAGE (OUTPATIENT)
Dept: UROLOGY | Facility: CLINIC | Age: 48
End: 2023-07-01
Payer: OTHER GOVERNMENT

## 2024-08-13 ENCOUNTER — OFFICE VISIT (OUTPATIENT)
Dept: UROLOGY | Facility: CLINIC | Age: 49
End: 2024-08-13
Payer: OTHER GOVERNMENT

## 2024-08-13 VITALS
HEART RATE: 76 BPM | HEIGHT: 66 IN | WEIGHT: 134.06 LBS | SYSTOLIC BLOOD PRESSURE: 126 MMHG | BODY MASS INDEX: 21.55 KG/M2 | DIASTOLIC BLOOD PRESSURE: 77 MMHG

## 2024-08-13 DIAGNOSIS — R30.0 DYSURIA: ICD-10-CM

## 2024-08-13 DIAGNOSIS — N39.0 RECURRENT UTI: Primary | ICD-10-CM

## 2024-08-13 DIAGNOSIS — N22 CALCULUS OF URINARY TRACT IN DISEASES CLASSIFIED ELSEWHERE: ICD-10-CM

## 2024-08-13 LAB
BACTERIA #/AREA URNS AUTO: ABNORMAL /HPF
MICROSCOPIC COMMENT: ABNORMAL
RBC #/AREA URNS AUTO: 1 /HPF (ref 0–4)
WBC #/AREA URNS AUTO: 9 /HPF (ref 0–5)

## 2024-08-13 PROCEDURE — 87086 URINE CULTURE/COLONY COUNT: CPT | Performed by: UROLOGY

## 2024-08-13 PROCEDURE — 99213 OFFICE O/P EST LOW 20 MIN: CPT | Mod: PBBFAC | Performed by: UROLOGY

## 2024-08-13 PROCEDURE — 81514 NFCT DS BV&VAGINITIS DNA ALG: CPT | Performed by: UROLOGY

## 2024-08-13 PROCEDURE — 87186 SC STD MICRODIL/AGAR DIL: CPT | Performed by: UROLOGY

## 2024-08-13 PROCEDURE — 99999 PR PBB SHADOW E&M-EST. PATIENT-LVL III: CPT | Mod: PBBFAC,,, | Performed by: UROLOGY

## 2024-08-13 PROCEDURE — 81001 URINALYSIS AUTO W/SCOPE: CPT | Performed by: UROLOGY

## 2024-08-13 PROCEDURE — 99214 OFFICE O/P EST MOD 30 MIN: CPT | Mod: S$PBB,,, | Performed by: UROLOGY

## 2024-08-13 PROCEDURE — 87088 URINE BACTERIA CULTURE: CPT | Performed by: UROLOGY

## 2024-08-13 NOTE — PROGRESS NOTES
"Urology - [facility]  Clinic Note    SUBJECTIVE:     Chief Complaint: dysuria at the end of stream    History of Present Illness:  Sonia Summers is a 48 y.o. female who presents to clinic for dysuria. She is established to our clinic to our clinic.   Has dysuria and was given long term antibiotics by someone.   In march she though she had a UTI diagnosed by "UA and Culture" but culture was negative and urine also.   No other urinary symptoms. Foul odor to her urine.     Left ureteroscopy 7/8/22. Initially presented to the ED and received Left ureteral stent 6/17/22.  Stone was 90% calcium phosphate. Residual 6mm lower pole not attainable with flexible ureteroscopy. HF 1500.   She is seeing Dr. Stovall in nephrology.     Dr. Stovall Note  -History of calcium oxalate stones but appears calcium phosphate stones more problematic  -Litholink showed low oxaluria, low urate and UOP in excess of 3L  -Repeat Litholink 4/2023 high urine calcium, phos, pH  -Start hydrochlorothiazide 12.5 mg daily  -Add high K foods to diet  -Keep UOP brisk 3L daily     4/10/23  Right kidney: The right kidney measures 12.1 cm. No cortical thinning. No loss of corticomedullary distinction. Resistive index measures 0.65.  No mass. No renal stone. Extrarenal pelvis.  No hydronephrosis.     Left kidney: The left kidney measures 12.4 cm. No cortical thinning. No loss of corticomedullary distinction. Resistive index measures 0.67.  No mass. Three nonobstructing stones within the inferior pole ranging from 2-5 mm.  No hydronephrosis.     Impression:     Nonobstructing left renal stones.    7/28/22  1.  Left ureteroscopy  2.  Cystoscopy  3.  Laser lithotripsy  4.  Stone basket extraction  5.  Fluoro < 1 h  6.  Pyeloscopy  7.  Stent exchange     Specimen(s): Stone for analysis     Staff Surgeon: Virgie Sprague MD     Assistant Surgeon: MD Huy Martinez MD     Anesthesia: General endotracheal anesthesia     Indications: Sonia" "YAQUELIN Summers is a 46 y.o. female with a left ureteral stone and renal stone, presenting for definitive stone management.  She currently does have a JJ ureteral stent in place.       Findings:   Distal left ureteral stone  Multiple left renal stones  Radiopaque stone fragment remains in lower pole    Urolithiasis  Patient reports resolution of left flank pain. Patient reports no further nausea, vomiting, fevers, hematuria, dysuria. Risk factors for urolithiasis: cola soft drink intake and history of stone disease.    Repeat KUB 8/8/22 shows persistence of 7 mm lower left kidney stone. Renal US with resolution of hydroureteronephrosis.      Anticoagulation:  No    OBJECTIVE:     Estimated body mass index is 21.63 kg/m² as calculated from the following:    Height as of this encounter: 5' 6" (1.676 m).    Weight as of this encounter: 60.8 kg (134 lb 0.6 oz).    Vital Signs (Most Recent)  Pulse: 76 (08/13/24 1402)  BP: 126/77 (08/13/24 1402)    Physical Exam  Vitals reviewed.   Pulmonary:      Effort: Pulmonary effort is normal.   Genitourinary:     Comments: The external genitalia were normal. No rash. Atrophic vaginitis was not present. The urethral showed no evidence of a urethral diverticulum.  And in and out cath was performed under sterile conditions. The PVR was 200 ml.    No prolapse. (Did not void prior).   Vaginal swab prob.   Neurological:      Mental Status: She is alert.         Lab Results   Component Value Date    BUN 10 07/31/2023    CREATININE 0.77 07/31/2023    WBC 6.07 06/17/2022    HGB 11.5 (L) 06/17/2022    HCT 34.8 (L) 06/17/2022     06/17/2022    AST 22 06/16/2022    ALT 16 06/16/2022    ALKPHOS 71 06/16/2022    ALBUMIN 4.2 06/16/2022      Imaging:    ASSESSMENT     1. Recurrent UTI    2. Dysuria      PLAN:   Sonia was seen today for bladder pain.    Diagnoses and all orders for this visit:    Recurrent UTI    Dysuria  -     Vaginosis Screen by DNA Probe  -     Urine culture  -     Urinalysis " Microscopic    Calculus of urinary tract in diseases classified elsewhere  -     CT Renal Stone Study ABD Pelvis WO; Future  -     Urinalysis Microscopic          Virgie Sprague MD     Letter to No ref. provider found

## 2024-08-14 LAB
BACTERIAL VAGINOSIS DNA: NEGATIVE
CANDIDA GLABRATA DNA: POSITIVE
CANDIDA KRUSEI DNA: NEGATIVE
CANDIDA RRNA VAG QL PROBE: NEGATIVE
T VAGINALIS RRNA GENITAL QL PROBE: NEGATIVE

## 2024-08-16 ENCOUNTER — PATIENT MESSAGE (OUTPATIENT)
Dept: UROLOGY | Facility: CLINIC | Age: 49
End: 2024-08-16
Payer: OTHER GOVERNMENT

## 2024-08-16 LAB — BACTERIA UR CULT: ABNORMAL

## 2024-08-16 RX ORDER — NITROFURANTOIN 25; 75 MG/1; MG/1
100 CAPSULE ORAL 2 TIMES DAILY
Qty: 20 CAPSULE | Refills: 0 | Status: SHIPPED | OUTPATIENT
Start: 2024-08-16 | End: 2024-08-26

## 2024-08-16 RX ORDER — FLUCONAZOLE 100 MG/1
TABLET ORAL
Qty: 6 TABLET | Refills: 0 | Status: SHIPPED | OUTPATIENT
Start: 2024-08-16

## 2024-08-25 NOTE — HPI
45 yo female who presents to [facility] ED with 4 days of intermittent left flank pain and left ureteral stone seen on CT scan. She states the pain is 8/10 colicky flank pain with associated frequency, urgency, and dysuria. No gross hematuria, fevers, or chills. She also has been having nausea without vomiting. She thought that she had a UTI and took 3 days of cipro from her home leftover doses; last dose taken today. UA micro in ED shows 14 RBC, 17 WBC, occasional bacteria, 2 squams. WBC is 14. Creatinine is 0.9. CT scan reviewed which shows. Left mild/moderate hydronephrosis with a calcification measuring 8 mm at the left UVJ. The course of the ureter is difficult to follow but no additional calcifications are seen in the path of the ureter. There are additional stones in the left kidney, one measuring 5 mm, 7.8 mm and 8.7 mm. She just ate crackers in the ED.   [FreeTextEntry1] : WILL REFER TO SURGERY FOR GALLSTONE EVALUATION LAB WORK FROM 8/1/24 REVIEWED: GGTP 57, ALK PHOS 129 WILL ALSO REFER TO GI FOR FURTHER EVALUATION OF LAB ABNORMALITIES WATCH DIET TO ER IN INTERIM IF SYMPTOMATIC CALL WITH ANY QUESTIONS, CONCERNS OR CHANGES

## 2024-09-03 ENCOUNTER — TELEPHONE (OUTPATIENT)
Dept: OBSTETRICS AND GYNECOLOGY | Facility: CLINIC | Age: 49
End: 2024-09-03
Payer: OTHER GOVERNMENT

## 2024-09-03 ENCOUNTER — OFFICE VISIT (OUTPATIENT)
Dept: UROLOGY | Facility: CLINIC | Age: 49
End: 2024-09-03
Payer: OTHER GOVERNMENT

## 2024-09-03 VITALS
SYSTOLIC BLOOD PRESSURE: 152 MMHG | HEIGHT: 66 IN | DIASTOLIC BLOOD PRESSURE: 88 MMHG | BODY MASS INDEX: 21.56 KG/M2 | HEART RATE: 70 BPM | WEIGHT: 134.13 LBS

## 2024-09-03 DIAGNOSIS — R30.0 DYSURIA: Primary | ICD-10-CM

## 2024-09-03 DIAGNOSIS — N20.0 NEPHROLITHIASIS: ICD-10-CM

## 2024-09-03 PROCEDURE — 99214 OFFICE O/P EST MOD 30 MIN: CPT | Mod: S$PBB,,, | Performed by: UROLOGY

## 2024-09-03 PROCEDURE — 99213 OFFICE O/P EST LOW 20 MIN: CPT | Mod: PBBFAC | Performed by: UROLOGY

## 2024-09-03 PROCEDURE — 99999 PR PBB SHADOW E&M-EST. PATIENT-LVL III: CPT | Mod: PBBFAC,,, | Performed by: UROLOGY

## 2024-09-03 PROCEDURE — 81514 NFCT DS BV&VAGINITIS DNA ALG: CPT | Performed by: UROLOGY

## 2024-09-03 PROCEDURE — 87088 URINE BACTERIA CULTURE: CPT | Performed by: UROLOGY

## 2024-09-03 PROCEDURE — 87086 URINE CULTURE/COLONY COUNT: CPT | Performed by: UROLOGY

## 2024-09-03 PROCEDURE — 87186 SC STD MICRODIL/AGAR DIL: CPT | Performed by: UROLOGY

## 2024-09-03 NOTE — PROGRESS NOTES
"Urology - [facility]  Clinic Note    SUBJECTIVE:     Chief Complaint: dysuria at the end of stream    History of Present Illness:  Sonia Summers is a 48 y.o. female who presents to clinic for dysuria.   Has dysuria and was given long term antibiotics by someone.   In march she though she had a UTI diagnosed by "UA and Culture" but culture was negative and urine also.   No other urinary symptoms. Foul odor to her urine.     She took the 10 days of macrobid, 2 days after finishing - she has worsening of symptoms again and feels like its back.     Has nephrology appt today.     Left ureteroscopy 7/8/22. Initially presented to the ED and received Left ureteral stent 6/17/22.  Stone was 90% calcium phosphate. Residual 6mm lower pole not attainable with flexible ureteroscopy. HF 1500.   She is seeing Dr. Stovall in nephrology.     Dr. Stovall Note  -History of calcium oxalate stones but appears calcium phosphate stones more problematic  -Litholink showed low oxaluria, low urate and UOP in excess of 3L  -Repeat Litholink 4/2023 high urine calcium, phos, pH  -Start hydrochlorothiazide 12.5 mg daily  -Add high K foods to diet  -Keep UOP brisk 3L daily     4/10/23  Right kidney: The right kidney measures 12.1 cm. No cortical thinning. No loss of corticomedullary distinction. Resistive index measures 0.65.  No mass. No renal stone. Extrarenal pelvis.  No hydronephrosis.     Left kidney: The left kidney measures 12.4 cm. No cortical thinning. No loss of corticomedullary distinction. Resistive index measures 0.67.  No mass. Three nonobstructing stones within the inferior pole ranging from 2-5 mm.  No hydronephrosis.     Impression:     Nonobstructing left renal stones.    7/28/22  1.  Left ureteroscopy  2.  Cystoscopy  3.  Laser lithotripsy  4.  Stone basket extraction  5.  Fluoro < 1 h  6.  Pyeloscopy  7.  Stent exchange     Specimen(s): Stone for analysis     Staff Surgeon: Virgie Sprague MD     Assistant Surgeon: " "MD Huy Martinez MD     Anesthesia: General endotracheal anesthesia     Indications: Sonia Summers is a 46 y.o. female with a left ureteral stone and renal stone, presenting for definitive stone management.  She currently does have a JJ ureteral stent in place.       Findings:   Distal left ureteral stone  Multiple left renal stones  Radiopaque stone fragment remains in lower pole    Urolithiasis  Patient reports resolution of left flank pain. Patient reports no further nausea, vomiting, fevers, hematuria, dysuria. Risk factors for urolithiasis: cola soft drink intake and history of stone disease.    Repeat KUB 22 shows persistence of 7 mm lower left kidney stone. Renal US with resolution of hydroureteronephrosis.      Anticoagulation:  No    OBJECTIVE:     Estimated body mass index is 21.64 kg/m² as calculated from the following:    Height as of this encounter: 5' 6" (1.676 m).    Weight as of this encounter: 60.8 kg (134 lb 1.6 oz).    Vital Signs (Most Recent)  Pulse: 70 (24 1110)  BP: (!) 152/88 (24 1110)    Physical Exam  Vitals reviewed.   Pulmonary:      Effort: Pulmonary effort is normal.   Genitourinary:     Comments: The external genitalia were normal. No rash. Atrophic vaginitis was not present. The urethral showed no evidence of a urethral diverticulum.  Vaginal swab prob.   Neurological:      Mental Status: She is alert.         Lab Results   Component Value Date    BUN 15 2024    CREATININE 0.89 2024    WBC 6.07 2022    HGB 11.5 (L) 2022    HCT 34.8 (L) 2022     2022    AST 22 2022    ALT 16 2022    ALKPHOS 71 2022    ALBUMIN 4.2 2022      Imagin/15/2014 ultrasound  The right kidney measures 12.9 cm in length and the left kidney measures 12.5 cm in length. The renal cortex has normal thickness and echogenicity. No solid masses.     Echogenic stones at the mid and lower poles of the left kidney " measuring 2 mm and 3 mm in size. There is no distention of the collecting systems or ureters on either side.     Images through the urinary bladder show no gross abnormalities.   ASSESSMENT     1. Dysuria    2. Nephrolithiasis        PLAN:   Sonia was seen today for follow-up.    Diagnoses and all orders for this visit:    Dysuria  -     Urine culture  -     Vaginosis Screen by DNA Probe    Nephrolithiasis      If above negative, plan for CT RSS and then cysto.   If vaginal swab negative, will need dilfucan/boric acid suppository and gyn follow up.  If culture positive for e.coli - will need d-mannose.   Recommend probiotics for now.   Continue azo for now, but can not take this long term.   Will schedule follow up based on results.     Has trip to [place] soon.       Virgie Sprague MD

## 2024-09-03 NOTE — TELEPHONE ENCOUNTER
----- Message from Isa Fischer sent at 9/3/2024  8:11 AM CDT -----  Regarding: self [phone]  Type:  Sooner Appointment Request    Patient is requesting a sooner appointment.  Patient declined first available appointment listed as well as another facility and provider .  Patient will not accept being placed on the waitlist and is requesting a message be sent to doctor.    Name of Caller:  self     When is the first available appointment?  NA     Symptoms: WWE and was referred by urologist to see GYN due to reoccurring UTI's. Pt has paper referral.     Would the patient rather a call back or a response via [facility]? Call back     Best Call Back Number:  [phone]

## 2024-09-04 LAB
BACTERIAL VAGINOSIS DNA: NEGATIVE
CANDIDA GLABRATA DNA: NEGATIVE
CANDIDA KRUSEI DNA: NEGATIVE
CANDIDA RRNA VAG QL PROBE: NEGATIVE
T VAGINALIS RRNA GENITAL QL PROBE: NEGATIVE

## 2024-09-05 ENCOUNTER — TELEPHONE (OUTPATIENT)
Dept: UROLOGY | Facility: CLINIC | Age: 49
End: 2024-09-05
Payer: OTHER GOVERNMENT

## 2024-09-05 RX ORDER — NITROFURANTOIN 25; 75 MG/1; MG/1
100 CAPSULE ORAL 2 TIMES DAILY
Qty: 20 CAPSULE | Refills: 0 | Status: SHIPPED | OUTPATIENT
Start: 2024-09-05 | End: 2024-09-15

## 2024-09-05 NOTE — TELEPHONE ENCOUNTER
Spoke to the patient. She verbally understood. The CT scan has been scheduled.    michael Pond      ----- Message from Virgie Sprague MD sent at 9/5/2024  2:05 PM CDT -----  Culture coming back positive. Sending macrobid for now. Schedule the CT RSS - rule out a stone.

## 2024-09-06 ENCOUNTER — PATIENT MESSAGE (OUTPATIENT)
Dept: UROLOGY | Facility: CLINIC | Age: 49
End: 2024-09-06
Payer: OTHER GOVERNMENT

## 2024-09-06 LAB — BACTERIA UR CULT: ABNORMAL

## 2024-09-17 ENCOUNTER — PATIENT MESSAGE (OUTPATIENT)
Dept: UROLOGY | Facility: CLINIC | Age: 49
End: 2024-09-17
Payer: OTHER GOVERNMENT

## 2024-09-17 ENCOUNTER — HOSPITAL ENCOUNTER (OUTPATIENT)
Dept: RADIOLOGY | Facility: HOSPITAL | Age: 49
Discharge: HOME | End: 2024-09-17
Attending: UROLOGY
Payer: OTHER GOVERNMENT

## 2024-09-17 DIAGNOSIS — N39.0 ACUTE UTI: Primary | ICD-10-CM

## 2024-09-17 DIAGNOSIS — N22 CALCULUS OF URINARY TRACT IN DISEASES CLASSIFIED ELSEWHERE: ICD-10-CM

## 2024-09-17 PROCEDURE — 74176 CT ABD & PELVIS W/O CONTRAST: CPT | Mod: 26,,, | Performed by: RADIOLOGY

## 2024-09-17 PROCEDURE — 74176 CT ABD & PELVIS W/O CONTRAST: CPT | Mod: TC

## 2024-09-20 ENCOUNTER — LAB VISIT (OUTPATIENT)
Dept: LAB | Facility: HOSPITAL | Age: 49
End: 2024-09-20
Attending: UROLOGY
Payer: OTHER GOVERNMENT

## 2024-09-20 DIAGNOSIS — N39.0 ACUTE UTI: ICD-10-CM

## 2024-09-20 PROCEDURE — 87088 URINE BACTERIA CULTURE: CPT | Performed by: UROLOGY

## 2024-09-20 PROCEDURE — 87086 URINE CULTURE/COLONY COUNT: CPT | Performed by: UROLOGY

## 2024-09-20 PROCEDURE — 87186 SC STD MICRODIL/AGAR DIL: CPT | Performed by: UROLOGY

## 2024-09-20 RX ORDER — GRANULES FOR ORAL 3 G/1
3 POWDER ORAL ONCE
Qty: 3 G | Refills: 0 | Status: SHIPPED | OUTPATIENT
Start: 2024-09-20 | End: 2024-09-20

## 2024-09-22 LAB — BACTERIA UR CULT: ABNORMAL

## 2024-09-29 RX ORDER — NITROFURANTOIN 25; 75 MG/1; MG/1
100 CAPSULE ORAL 2 TIMES DAILY
Qty: 20 CAPSULE | Refills: 0 | Status: SHIPPED | OUTPATIENT
Start: 2024-09-29 | End: 2024-10-09

## 2024-10-01 ENCOUNTER — OFFICE VISIT (OUTPATIENT)
Dept: UROLOGY | Facility: CLINIC | Age: 49
End: 2024-10-01
Payer: OTHER GOVERNMENT

## 2024-10-01 VITALS
HEIGHT: 66 IN | DIASTOLIC BLOOD PRESSURE: 77 MMHG | SYSTOLIC BLOOD PRESSURE: 129 MMHG | WEIGHT: 129.88 LBS | BODY MASS INDEX: 20.87 KG/M2 | HEART RATE: 72 BPM

## 2024-10-01 DIAGNOSIS — N20.0 LEFT RENAL STONE: Primary | ICD-10-CM

## 2024-10-01 DIAGNOSIS — N39.0 RECURRENT UTI: ICD-10-CM

## 2024-10-01 PROCEDURE — 99213 OFFICE O/P EST LOW 20 MIN: CPT | Mod: PBBFAC | Performed by: UROLOGY

## 2024-10-01 PROCEDURE — 99999 PR PBB SHADOW E&M-EST. PATIENT-LVL III: CPT | Mod: PBBFAC,,, | Performed by: UROLOGY

## 2024-10-01 PROCEDURE — 99214 OFFICE O/P EST MOD 30 MIN: CPT | Mod: S$PBB,,, | Performed by: UROLOGY

## 2024-10-01 RX ORDER — NITROFURANTOIN MACROCRYSTALS 50 MG/1
50 CAPSULE ORAL DAILY
Qty: 30 CAPSULE | Refills: 1 | Status: SHIPPED | OUTPATIENT
Start: 2024-10-01

## 2024-10-01 NOTE — Clinical Note
Can you look at this patient's films. I couldn't get all the stone out because of the angle from the UPJ. You can look at my intra-op films. She is now having recurrent UTI - I think all the stone needs to be gone. Can you look at CT also and see if you think we could get it all with a PCNL access or should I try ureteroscopy again?

## 2024-10-01 NOTE — PROGRESS NOTES
Urology - [facility]  Clinic Note    SUBJECTIVE:     Chief Complaint: dysuria at the end of stream    History of Present Illness:  Sonia Summers is a 48 y.o. female who presents to clinic for follow up.   Many recurrent ESBL UTI.     Had this happen in the past, everything better after ureteroscopy.   Now happening again 2 years later.     She took the 10 days of macrobid, 2 days after finishing - she has worsening of symptoms again and feels like its back.     Left ureteroscopy 7/8/22. Initially presented to the ED and received Left ureteral stent 6/17/22.  Stone was 90% calcium phosphate. Residual 6mm lower pole not attainable with flexible ureteroscopy. HF 1500.   She is seeing Dr. Stovall in nephrology.     Dr. Stovall Note  -History of calcium oxalate stones but appears calcium phosphate stones more problematic  -Litholink showed low oxaluria, low urate and UOP in excess of 3L  -Repeat Litholink 4/2023 high urine calcium, phos, pH  -Start hydrochlorothiazide 12.5 mg daily  -Add high K foods to diet  -Keep UOP brisk 3L daily     4/10/23  Right kidney: The right kidney measures 12.1 cm. No cortical thinning. No loss of corticomedullary distinction. Resistive index measures 0.65.  No mass. No renal stone. Extrarenal pelvis.  No hydronephrosis.     Left kidney: The left kidney measures 12.4 cm. No cortical thinning. No loss of corticomedullary distinction. Resistive index measures 0.67.  No mass. Three nonobstructing stones within the inferior pole ranging from 2-5 mm.  No hydronephrosis.     Impression:     Nonobstructing left renal stones.    7/28/22  1.  Left ureteroscopy  2.  Cystoscopy  3.  Laser lithotripsy  4.  Stone basket extraction  5.  Fluoro < 1 h  6.  Pyeloscopy  7.  Stent exchange     Specimen(s): Stone for analysis     Staff Surgeon: Virgie Sprague MD     Assistant Surgeon: MD Huy Martinez MD     Anesthesia: General endotracheal anesthesia     Indications: Sonia Summers  "is a 46 y.o. female with a left ureteral stone and renal stone, presenting for definitive stone management.  She currently does have a JJ ureteral stent in place.       Findings:   Distal left ureteral stone  Multiple left renal stones  Radiopaque stone fragment remains in lower pole    Urolithiasis  Patient reports resolution of left flank pain. Patient reports no further nausea, vomiting, fevers, hematuria, dysuria. Risk factors for urolithiasis: cola soft drink intake and history of stone disease.    Repeat KUB 22 shows persistence of 7 mm lower left kidney stone. Renal US with resolution of hydroureteronephrosis.      Anticoagulation:  No    OBJECTIVE:     Estimated body mass index is 20.97 kg/m² as calculated from the following:    Height as of this encounter: 5' 6" (1.676 m).    Weight as of this encounter: 58.9 kg (129 lb 14.4 oz).    Vital Signs (Most Recent)  Pulse: 72 (10/01/24 0819)  BP: 129/77 (10/01/24 0819)    Physical Exam  Vitals reviewed.   Pulmonary:      Effort: Pulmonary effort is normal.   Neurological:      Mental Status: She is alert.         Lab Results   Component Value Date    BUN 15 2024    CREATININE 0.89 2024    WBC 6.07 2022    HGB 11.5 (L) 2022    HCT 34.8 (L) 2022     2022    AST 22 2022    ALT 16 2022    ALKPHOS 71 2022    ALBUMIN 4.2 2022      Imagin/15/2014 ultrasound  The right kidney measures 12.9 cm in length and the left kidney measures 12.5 cm in length. The renal cortex has normal thickness and echogenicity. No solid masses.     Echogenic stones at the mid and lower poles of the left kidney measuring 2 mm and 3 mm in size. There is no distention of the collecting systems or ureters on either side.     Images through the urinary bladder show no gross abnormalities.   ASSESSMENT     1. Left renal stone    2. Recurrent UTI          PLAN:   Sonia was seen today for nephrolithiasis.    Diagnoses and all " orders for this visit:    Left renal stone    Recurrent UTI    Other orders  -     nitrofurantoin (MACRODANTIN) 50 MG capsule; Take 1 capsule (50 mg total) by mouth once daily.      Stay on daily macrodantin after macrobid.   Will talk with IR about possible PCNL vs. ureteroscopy    Virgie Sprague MD

## 2024-10-02 ENCOUNTER — PATIENT MESSAGE (OUTPATIENT)
Dept: UROLOGY | Facility: CLINIC | Age: 49
End: 2024-10-02
Payer: OTHER GOVERNMENT

## 2024-10-02 DIAGNOSIS — N39.0 RECURRENT UTI: ICD-10-CM

## 2024-10-02 DIAGNOSIS — N20.0 LEFT RENAL STONE: Primary | ICD-10-CM

## 2024-10-07 ENCOUNTER — TELEPHONE (OUTPATIENT)
Dept: UROLOGY | Facility: HOSPITAL | Age: 49
End: 2024-10-07
Payer: OTHER GOVERNMENT

## 2024-10-07 DIAGNOSIS — N20.0 LEFT RENAL STONE: Primary | ICD-10-CM

## 2024-10-08 ENCOUNTER — TELEPHONE (OUTPATIENT)
Dept: INTERVENTIONAL RADIOLOGY/VASCULAR | Facility: CLINIC | Age: 49
End: 2024-10-08
Payer: OTHER GOVERNMENT

## 2024-10-09 ENCOUNTER — TELEPHONE (OUTPATIENT)
Dept: INTERVENTIONAL RADIOLOGY/VASCULAR | Facility: CLINIC | Age: 49
End: 2024-10-09
Payer: OTHER GOVERNMENT

## 2024-10-10 ENCOUNTER — PATIENT MESSAGE (OUTPATIENT)
Dept: UROLOGY | Facility: CLINIC | Age: 49
End: 2024-10-10
Payer: OTHER GOVERNMENT

## 2024-10-14 ENCOUNTER — PATIENT MESSAGE (OUTPATIENT)
Dept: UROLOGY | Facility: CLINIC | Age: 49
End: 2024-10-14
Payer: OTHER GOVERNMENT

## 2024-10-14 ENCOUNTER — TELEPHONE (OUTPATIENT)
Dept: UROLOGY | Facility: CLINIC | Age: 49
End: 2024-10-14
Payer: OTHER GOVERNMENT

## 2024-10-14 DIAGNOSIS — N39.0 RECURRENT UTI: ICD-10-CM

## 2024-10-14 DIAGNOSIS — N20.0 LEFT RENAL STONE: Primary | ICD-10-CM

## 2024-10-15 ENCOUNTER — TELEPHONE (OUTPATIENT)
Dept: UROLOGY | Facility: CLINIC | Age: 49
End: 2024-10-15
Payer: OTHER GOVERNMENT

## 2024-10-15 NOTE — TELEPHONE ENCOUNTER
Urine culture has been scheduled. Message was left on the patient's VM.    JENNIFER Pond      ----- Message from Med Assistant Adiel sent at 10/14/2024  2:12 PM CDT -----  I just put in case request for patient. Please review     thanks  ----- Message -----  From: Archie Alfonso RN  Sent: 10/14/2024   1:31 PM CDT  To: Adiel Banegas MA; JENNIFER Pickering since it's in OR it would just go in as a case request.  Let me know if you run into any trouble.  ----- Message -----  From: Virgie Sprague MD  Sent: 10/14/2024  12:41 PM CDT  To: Adiel Banegas MA; Archie Alfonso RN; #    Lets book the whole case at one time - Dr. Cuadra will do the access. My case is as follows on 11/8/24 in the 2nd floor OR ([facility]).     Left PCNL, cystoscopy, ureteral stent, retrograde pyelogram, nephrostogram, ureteroscopy possible  Left renal stone, recurrent UTI  General  Time 2.5 hours  Urine culture 1 week prior.   To be done with IR (Dr. Cuadra has agreed to get the access that morning in the OR).  ----- Message -----  From: Cinthya Woody  Sent: 10/11/2024   2:55 PM CDT  To: Virgie Sprague MD; #    Follow up PCNL access OR on 11/08 with Dr. Cuadra, holding 7am-11am. Please let me know what time surgery will be schedule on that day and IR appointment will be schedule at the appropriate time.      Thanks  PD  
You can access the FollowMyHealth Patient Portal offered by [facility] by registering at the following website: [email or web address]. By joining [employer]’s FollowMyHealth portal, you will also be able to view your health information using other applications (apps) compatible with our system.

## 2024-10-17 ENCOUNTER — TELEPHONE (OUTPATIENT)
Dept: PREADMISSION TESTING | Facility: HOSPITAL | Age: 49
End: 2024-10-17

## 2024-10-17 NOTE — ANESTHESIA PAT ROS NOTE
10/17/2024  Sonia Summers is a 48 y.o., female.      Pre-op Assessment          Review of Systems           Anesthesia Assessment: Preoperative EQUATION    Planned Procedure: Procedure(s) (LRB):  NEPHROLITHOTRIPSY, PERCUTANEOUS, USING LASER (Left)  Requested Anesthesia Type:General  Surgeon: Virgie Sprague MD  Service: Urology  Known or anticipated Date of Surgery:11/8/2024    Surgeon notes: reviewed    Electronic QUestionnaire Assessment completed via nurse interview with patient.        Triage considerations:         Previous anesthesia records:GETA and No problems  07/08/22 CYSTOSCOPY (Bladder), URETEROSCOPY (Left: Ureter), REMOVAL, STENT, URETER (Left: Ureter), REMOVAL, CALCULUS, URETER, URETEROSCOPIC (Left: Ureter), REPLACEMENT, STENT (Left: Ureter), PYELOSCOPY (Ureter), LITHOTRIPSY, USING LASER (Ureter), general anesthesia, ASA 2  Intubation     Date/Time: 7/8/2022 11:21 AM  Performed by: Julio Collado CRNA  Authorized by: Humberto Andrews MD      Intubation:     Induction:  Intravenous    Intubated:  Postinduction    Mask Ventilation:  Easy mask    Attempts:  1    Attempted By:  Student    Method of Intubation:  Direct    Blade:  Jase 3    Laryngeal View Grade: Grade I - full view of cords      Difficult Airway Encountered?: No      Complications:  None    Airway Device:  Oral endotracheal tube    Airway Device Size:  7.0    Style/Cuff Inflation:  Cuffed (inflated to minimal occlusive pressure)    Tube secured:  23    Secured at:  The lips    Placement Verified By:  Capnometry    Complicating Factors:  None    Findings Post-Intubation:  Atraumatic/condition of teeth unchanged and BS equal bilateral    Last PCP note: 3-6 months ago , outside [facility]   Subspecialty notes: Urology    Other important co-morbidities: HLD and left renal stone, chronic recurrent UTI, acute cystitis,  dysuria, h/o left ureteral stone, h/o nephrolithiasis, hypercalciuria, osteopenia, vitamin D deficiency, hyperoxaluria, AR, h/o , h/o breast cyst, h/o breast biopsy, TMJ pain, preDM, h/o palpitations/PVCs, oligomenorrhea, premenopausal menorrhagia, anemia, dry eye syndrome, h/o skin cancer, h/o CNN Hand left AP and lat       Tests already available:  Available tests,  within 3 months , within [facility] .   24 BMP, MAG, PHOS            Instructions     Optimization:  Anesthesia Preop Clinic Assessment Indicated    Medical Opinion Indicated       Sub-specialist consult indicated: TBD       Plan:    Testing:  CBC, CMP, and T&S   Pre-anesthesia  visit       Visit focus: concerns in complex and/or prolonged anesthesia, Edson 3, last anes , TMJ     Consultation:IM Perioperative Hospitalist    Navigation: Tests Scheduled.              Consults scheduled.             Results will be tracked by Preop Clinic.

## 2024-10-18 DIAGNOSIS — N20.0 LEFT RENAL STONE: Primary | ICD-10-CM

## 2024-10-21 ENCOUNTER — TELEPHONE (OUTPATIENT)
Dept: PREADMISSION TESTING | Facility: HOSPITAL | Age: 49
End: 2024-10-21
Payer: OTHER GOVERNMENT

## 2024-10-22 ENCOUNTER — PATIENT MESSAGE (OUTPATIENT)
Dept: RESEARCH | Facility: HOSPITAL | Age: 49
End: 2024-10-22
Payer: OTHER GOVERNMENT

## 2024-10-25 ENCOUNTER — TELEPHONE (OUTPATIENT)
Dept: INTERNAL MEDICINE | Facility: CLINIC | Age: 49
End: 2024-10-25
Payer: OTHER GOVERNMENT

## 2024-10-28 ENCOUNTER — OFFICE VISIT (OUTPATIENT)
Dept: INTERNAL MEDICINE | Facility: CLINIC | Age: 49
End: 2024-10-28
Payer: OTHER GOVERNMENT

## 2024-10-28 ENCOUNTER — LAB VISIT (OUTPATIENT)
Dept: LAB | Facility: HOSPITAL | Age: 49
End: 2024-10-28
Attending: UROLOGY
Payer: OTHER GOVERNMENT

## 2024-10-28 ENCOUNTER — PATIENT MESSAGE (OUTPATIENT)
Dept: UROLOGY | Facility: CLINIC | Age: 49
End: 2024-10-28
Payer: OTHER GOVERNMENT

## 2024-10-28 ENCOUNTER — HOSPITAL ENCOUNTER (OUTPATIENT)
Dept: CARDIOLOGY | Facility: CLINIC | Age: 49
Discharge: HOME | End: 2024-10-28
Payer: OTHER GOVERNMENT

## 2024-10-28 VITALS
SYSTOLIC BLOOD PRESSURE: 140 MMHG | HEART RATE: 68 BPM | HEIGHT: 66 IN | DIASTOLIC BLOOD PRESSURE: 85 MMHG | WEIGHT: 132.19 LBS | TEMPERATURE: 98 F | OXYGEN SATURATION: 99 % | BODY MASS INDEX: 21.24 KG/M2

## 2024-10-28 DIAGNOSIS — N20.0 LEFT RENAL STONE: Primary | ICD-10-CM

## 2024-10-28 DIAGNOSIS — N39.0 CHRONIC UTI: ICD-10-CM

## 2024-10-28 DIAGNOSIS — N20.0 LEFT RENAL STONE: ICD-10-CM

## 2024-10-28 DIAGNOSIS — R03.0 ELEVATED BLOOD PRESSURE READING: ICD-10-CM

## 2024-10-28 DIAGNOSIS — N39.0 RECURRENT UTI: ICD-10-CM

## 2024-10-28 DIAGNOSIS — M85.80 OSTEOPENIA, UNSPECIFIED LOCATION: ICD-10-CM

## 2024-10-28 LAB
OHS QRS DURATION: 98 MS
OHS QTC CALCULATION: 443 MS

## 2024-10-28 PROCEDURE — 93010 ELECTROCARDIOGRAM REPORT: CPT | Mod: S$PBB,,, | Performed by: INTERNAL MEDICINE

## 2024-10-28 PROCEDURE — 99214 OFFICE O/P EST MOD 30 MIN: CPT | Mod: PBBFAC,25 | Performed by: NURSE PRACTITIONER

## 2024-10-28 PROCEDURE — 99999 PR PBB SHADOW E&M-EST. PATIENT-LVL IV: CPT | Mod: PBBFAC,,, | Performed by: NURSE PRACTITIONER

## 2024-10-28 PROCEDURE — 87186 SC STD MICRODIL/AGAR DIL: CPT | Performed by: UROLOGY

## 2024-10-28 PROCEDURE — 93005 ELECTROCARDIOGRAM TRACING: CPT | Mod: PBBFAC | Performed by: INTERNAL MEDICINE

## 2024-10-28 PROCEDURE — 87086 URINE CULTURE/COLONY COUNT: CPT | Performed by: UROLOGY

## 2024-10-28 PROCEDURE — 87088 URINE BACTERIA CULTURE: CPT | Performed by: UROLOGY

## 2024-10-28 PROCEDURE — 99202 OFFICE O/P NEW SF 15 MIN: CPT | Mod: S$PBB,,, | Performed by: NURSE PRACTITIONER

## 2024-10-28 RX ORDER — NITROFURANTOIN MACROCRYSTALS 50 MG/1
50 CAPSULE ORAL DAILY
COMMUNITY
End: 2024-10-29 | Stop reason: SDUPTHER

## 2024-10-29 RX ORDER — NITROFURANTOIN MACROCRYSTALS 50 MG/1
50 CAPSULE ORAL DAILY
Qty: 30 CAPSULE | Refills: 1 | Status: SHIPPED | OUTPATIENT
Start: 2024-10-29

## 2024-10-30 LAB — BACTERIA UR CULT: ABNORMAL

## 2024-10-31 RX ORDER — LIDOCAINE HYDROCHLORIDE 10 MG/ML
1 INJECTION, SOLUTION EPIDURAL; INFILTRATION; INTRACAUDAL; PERINEURAL ONCE
Status: CANCELLED | OUTPATIENT
Start: 2024-10-31 | End: 2024-10-31

## 2024-10-31 RX ORDER — SODIUM CHLORIDE 9 MG/ML
INJECTION, SOLUTION INTRAVENOUS CONTINUOUS
Status: CANCELLED | OUTPATIENT
Start: 2024-10-31

## 2024-11-01 ENCOUNTER — PATIENT MESSAGE (OUTPATIENT)
Dept: UROLOGY | Facility: CLINIC | Age: 49
End: 2024-11-01
Payer: OTHER GOVERNMENT

## 2024-11-01 RX ORDER — CIPROFLOXACIN 500 MG/1
500 TABLET ORAL 2 TIMES DAILY
Qty: 20 TABLET | Refills: 0 | Status: SHIPPED | OUTPATIENT
Start: 2024-11-01 | End: 2024-11-11

## 2024-11-02 ENCOUNTER — PATIENT MESSAGE (OUTPATIENT)
Dept: UROLOGY | Facility: CLINIC | Age: 49
End: 2024-11-02
Payer: OTHER GOVERNMENT

## 2024-11-07 ENCOUNTER — TELEPHONE (OUTPATIENT)
Dept: UROLOGY | Facility: CLINIC | Age: 49
End: 2024-11-07
Payer: OTHER GOVERNMENT

## 2024-11-07 ENCOUNTER — PATIENT MESSAGE (OUTPATIENT)
Dept: UROLOGY | Facility: CLINIC | Age: 49
End: 2024-11-07
Payer: OTHER GOVERNMENT

## 2024-11-07 ENCOUNTER — ANESTHESIA EVENT (OUTPATIENT)
Dept: SURGERY | Facility: HOSPITAL | Age: 49
End: 2024-11-07
Payer: OTHER GOVERNMENT

## 2024-11-07 NOTE — ANESTHESIA PREPROCEDURE EVALUATION
[facility]  Anesthesia Pre-Operative Evaluation        Patient Name: Sonia Summers  YOB: 1975  MRN: [id number]    SUBJECTIVE:     Pre-operative Evaluation for Procedure(s) (LRB):  NEPHROLITHOTRIPSY, PERCUTANEOUS, USING LASER (Left)     11/07/2024    Sonia Summers is a 49 y.o. female with a PMHx significant for HTN, kidney stones.     The patient now presents for the above procedure(s).    Previous Airway          Intubation     Date/Time: 7/8/2022 11:21 AM  Performed by: Julio Collado CRNA  Authorized by: Humberto Andrews MD      Intubation:     Induction:  Intravenous    Intubated:  Postinduction    Mask Ventilation:  Easy mask    Attempts:  1    Attempted By:  Student    Method of Intubation:  Direct    Blade:  Jase 3    Laryngeal View Grade: Grade I - full view of cords      Difficult Airway Encountered?: No      Complications:  None    Airway Device:  Oral endotracheal tube    Airway Device Size:  7.0    Style/Cuff Inflation:  Cuffed (inflated to minimal occlusive pressure)    Tube secured:  23    Secured at:  The lips    Placement Verified By:  Capnometry    Complicating Factors:  None    Findings Post-Intubation:  Atraumatic/condition of teeth unchanged and BS equal bilateral          Patient Active Problem List   Diagnosis    Left renal stone    Left ureteral stone    Chronic UTI    Osteopenia    Elevated blood pressure reading       Past Medical History:   Diagnosis Date    Anemia     Disorder of kidney and ureter     PONV (postoperative nausea and vomiting)        Review of patient's allergies indicates:   Allergen Reactions    Sulfa (sulfonamide antibiotics) Rash       Current Outpatient Medications   Medication Instructions    ciprofloxacin HCl (CIPRO) 500 mg, Oral, 2 times daily    hydroCHLOROthiazide (MICROZIDE) 12.5 mg    nitrofurantoin (MACRODANTIN) 50 mg, Oral, Daily    RESTASIS 0.05 % ophthalmic emulsion No dose, route, or frequency recorded.     vitamin D (VITAMIN D3) 1,000 Units       Past Surgical History:   Procedure Laterality Date     SECTION      CYSTOSCOPY  2022    Procedure: CYSTOSCOPY;  Surgeon: Virgie Sprague MD;  [facility];  Service: Urology;;    CYSTOSCOPY W/ URETERAL STENT PLACEMENT Left 2022    Procedure: CYSTOSCOPY, WITH URETERAL STENT INSERTION;  Surgeon: Jazz Wood MD;  [facility];  Service: Urology;  Laterality: Left;    HAND SURGERY Left     LASER LITHOTRIPSY  2022    Procedure: LITHOTRIPSY, USING LASER;  Surgeon: Virgie Sprague MD;  [facility];  Service: Urology;;    PYELOSCOPY  2022    Procedure: PYELOSCOPY;  Surgeon: Virgie Sprague MD;  [facility];  Service: Urology;;    REPLACEMENT OF STENT Left 2022    Procedure: REPLACEMENT, STENT;  Surgeon: Virgie Sprague MD;  [facility];  Service: Urology;  Laterality: Left;    URETEROSCOPIC REMOVAL OF URETERIC CALCULUS Left 2022    Procedure: REMOVAL, CALCULUS, URETER, URETEROSCOPIC;  Surgeon: Virgie Sprague MD;  [facility];  Service: Urology;  Laterality: Left;    URETEROSCOPY Left 2022    Procedure: URETEROSCOPY;  Surgeon: Virgie Sprague MD;  [facility];  Service: Urology;  Laterality: Left;       Social History     Substance and Sexual Activity   Drug Use Not Currently     Alcohol Use: Not At Risk (2024)    AUDIT-C     Frequency of Alcohol Consumption: 4 or more times a week     Average Number of Drinks: 1 or 2     Frequency of Binge Drinking: Never     Tobacco Use: Low Risk  (2024)    Patient History     Smoking Tobacco Use: Never     Smokeless Tobacco Use: Never     Passive Exposure: Not on file       OBJECTIVE:     Vital Signs Range (Last 24H):         Significant Labs    Heme Profile  Lab Results   Component Value Date    WBC 7.05 10/28/2024    HGB 14.6 10/28/2024    HCT  "45.6 10/28/2024     10/28/2024       Coagulation Studies  No results found for: "LABPROT", "INR", "APTT"    BMP  Lab Results   Component Value Date     10/28/2024    K 4.0 10/28/2024     10/28/2024    CO2 27 10/28/2024    BUN 8 10/28/2024    CREATININE 0.8 10/28/2024       Liver Function Tests  Lab Results   Component Value Date    AST 16 10/28/2024    ALT 11 10/28/2024    ALKPHOS 74 10/28/2024    BILITOT 0.8 10/28/2024    PROT 8.0 10/28/2024    ALBUMIN 4.5 10/28/2024       Lipid Profile  Lab Results   Component Value Date    CHOL 192 08/07/2023    HDL 70 08/07/2023    TRIG 61 08/07/2023       Endocrine Profile  No results found for: "HGBA1C", "TSH"      Cardiac Studies    EKG:   Results for orders placed or performed during the hospital encounter of 10/28/24   EKG 12-lead    Collection Time: 10/28/24 11:48 AM   Result Value Ref Range    QRS Duration 98 ms    [facility] QTC Calculation 443 ms    Narrative    Test Reason : R03.0,    Vent. Rate : 067 BPM     Atrial Rate : 067 BPM     P-R Int : 134 ms          QRS Dur : 098 ms      QT Int : 420 ms       P-R-T Axes : 069 085 063 degrees     QTc Int : 443 ms    Normal sinus rhythm  Incomplete right bundle branch block  Borderline Abnormal ECG  No previous ECGs available  Confirmed by Yehuda DAVILA MD ([id number]) on 10/28/2024 9:13:20 PM    Referred By: AMALIA TATUM           Confirmed By:Yehuda DAVILA MD       ADRIANNA  No results found for this or any previous visit.      TTE  No results found for this or any previous visit.        ASSESSMENT/PLAN:           Pre-op Assessment    I have reviewed the Patient Summary Reports.     I have reviewed the Nursing Notes. I have reviewed the NPO Status.   I have reviewed the Medications.     Review of Systems  Anesthesia Hx:  No problems with previous Anesthesia   History of prior surgery of interest to airway management or planning:          Denies Family Hx of Anesthesia complications.    Denies Personal Hx of Anesthesia " complications.                    Social:  Non-Smoker       Hematology/Oncology:  Hematology Normal   Oncology Normal    -- Denies Anemia:                  Denies Current/Recent Cancer  --  Denies Cancer in past history:                     EENT/Dental:  EENT/Dental Normal           Pulmonary:  Pulmonary Normal                       Renal/:  Renal/ Normal  Denies Chronic Renal Disease.                Hepatic/GI:  Hepatic/GI Normal     Denies GERD.                Musculoskeletal:  Musculoskeletal Normal                Neurological:  Neurology Normal   Denies CVA.    Denies Seizures.                                Endocrine:  Endocrine Normal Denies Diabetes.           Dermatological:  Skin Normal    Psych:  Psychiatric Normal                    Physical Exam  General: Well nourished, Cooperative, Alert and Oriented    Airway:  Mallampati: III / II  Mouth Opening: Normal  TM Distance: Normal  Tongue: Normal  Neck ROM: Normal ROM    Chest/Lungs:  Normal Respiratory Rate    Heart:  Rate: Normal  Rhythm: Regular Rhythm        Anesthesia Plan  Type of Anesthesia, risks & benefits discussed:    Anesthesia Type: Gen ETT  Intra-op Monitoring Plan: Standard ASA Monitors  Post Op Pain Control Plan: multimodal analgesia and IV/PO Opioids PRN  Induction:  IV  Airway Plan: Direct and Video, Post-Induction  Informed Consent: Informed consent signed with the Patient and all parties understand the risks and agree with anesthesia plan.  All questions answered.   ASA Score: 2  Day of Surgery Review of History & Physical: H&P Update referred to the surgeon/provider.    Ready For Surgery From Anesthesia Perspective.     .

## 2024-11-07 NOTE — TELEPHONE ENCOUNTER
Spoke to the patient. Arrival time will 8:40 am. Patient was advise to drink clear fluids up to 2 hours prior to scheduled arrival time.    She verbally understood.    JENNIFER Pond      You are to report to the [facility] (glass door located on the back side of the hospital, in [facility] the [facility] You will need someone to drive you home following your procedure, since you will be having sedation.      - ALL ASPIRIN related products/BLOOD THINNERS should have been stopped 7 days prior to the procedure.   - No alcohol 24 hours before and 24 hours after the procedure  - No smoking a few days prior (72 hours before)  - NOTHING TO EAT OR DRINK AFTER MIDNIGHT (12:00 am    Patients should stop full meals at midnight, but they can consume clear liquids up to 2 hours prior to scheduled arrival time.    Clear liquids include Gatorade, water, soda, black coffee or tea (no milk or creamer), and clear juices.    Clear liquids do NOT include anything with pulp or food particles (Chicken broth, ice cream, yogurt, Jello, etc.)      - Take a shower the night before and the morning of with antibacterial soap/ Hibiclens  - No lotion, perfumes/cologne, deodorant or powder in the morning.    If you have any further questions, please don't hesitate to reach me at [phone]

## 2024-11-08 ENCOUNTER — ANESTHESIA (OUTPATIENT)
Dept: SURGERY | Facility: HOSPITAL | Age: 49
End: 2024-11-08
Payer: OTHER GOVERNMENT

## 2024-11-08 ENCOUNTER — HOSPITAL ENCOUNTER (OUTPATIENT)
Facility: HOSPITAL | Age: 49
Discharge: HOME | End: 2024-11-08
Attending: UROLOGY | Admitting: UROLOGY
Payer: OTHER GOVERNMENT

## 2024-11-08 ENCOUNTER — HOSPITAL ENCOUNTER (OUTPATIENT)
Dept: INTERVENTIONAL RADIOLOGY/VASCULAR | Facility: HOSPITAL | Age: 49
Discharge: HOME | End: 2024-11-08
Admitting: UROLOGY
Payer: OTHER GOVERNMENT

## 2024-11-08 VITALS
OXYGEN SATURATION: 100 % | DIASTOLIC BLOOD PRESSURE: 68 MMHG | HEIGHT: 66 IN | HEART RATE: 58 BPM | WEIGHT: 132.06 LBS | BODY MASS INDEX: 21.22 KG/M2 | TEMPERATURE: 98 F | RESPIRATION RATE: 33 BRPM | SYSTOLIC BLOOD PRESSURE: 117 MMHG

## 2024-11-08 DIAGNOSIS — Z01.818 PREOP TESTING: Primary | ICD-10-CM

## 2024-11-08 DIAGNOSIS — N20.0 NEPHROLITHIASIS: ICD-10-CM

## 2024-11-08 DIAGNOSIS — N20.0 LEFT RENAL STONE: Primary | ICD-10-CM

## 2024-11-08 PROCEDURE — 27201423 OPTIME MED/SURG SUP & DEVICES STERILE SUPPLY: Performed by: UROLOGY

## 2024-11-08 PROCEDURE — 71000044 HC DOSC ROUTINE RECOVERY FIRST HOUR: Performed by: UROLOGY

## 2024-11-08 PROCEDURE — 25000003 PHARM REV CODE 250

## 2024-11-08 PROCEDURE — 52005 CYSTO W/URTRL CATHJ: CPT | Mod: 51,,, | Performed by: UROLOGY

## 2024-11-08 PROCEDURE — 87102 FUNGUS ISOLATION CULTURE: CPT | Performed by: UROLOGY

## 2024-11-08 PROCEDURE — 37000009 HC ANESTHESIA EA ADD 15 MINS: Performed by: UROLOGY

## 2024-11-08 PROCEDURE — 25500020 PHARM REV CODE 255: Performed by: UROLOGY

## 2024-11-08 PROCEDURE — 87116 MYCOBACTERIA CULTURE: CPT | Performed by: UROLOGY

## 2024-11-08 PROCEDURE — 36000707: Performed by: UROLOGY

## 2024-11-08 PROCEDURE — C1894 INTRO/SHEATH, NON-LASER: HCPCS | Performed by: UROLOGY

## 2024-11-08 PROCEDURE — 63600175 PHARM REV CODE 636 W HCPCS

## 2024-11-08 PROCEDURE — 71000015 HC POSTOP RECOV 1ST HR: Performed by: UROLOGY

## 2024-11-08 PROCEDURE — 27201423 OPTIME MED/SURG SUP & DEVICES STERILE SUPPLY

## 2024-11-08 PROCEDURE — 50080 PERQ NL/PL LITHOTRP SMPL<2CM: CPT | Mod: LT,,, | Performed by: UROLOGY

## 2024-11-08 PROCEDURE — 82365 CALCULUS SPECTROSCOPY: CPT | Performed by: UROLOGY

## 2024-11-08 PROCEDURE — 87176 TISSUE HOMOGENIZATION CULTR: CPT | Performed by: UROLOGY

## 2024-11-08 PROCEDURE — C1769 GUIDE WIRE: HCPCS

## 2024-11-08 PROCEDURE — 37000008 HC ANESTHESIA 1ST 15 MINUTES: Performed by: UROLOGY

## 2024-11-08 PROCEDURE — C1894 INTRO/SHEATH, NON-LASER: HCPCS

## 2024-11-08 PROCEDURE — 87070 CULTURE OTHR SPECIMN AEROBIC: CPT | Performed by: UROLOGY

## 2024-11-08 PROCEDURE — 87075 CULTR BACTERIA EXCEPT BLOOD: CPT | Performed by: UROLOGY

## 2024-11-08 PROCEDURE — 50433 PLMT NEPHROURETERAL CATHETER: CPT | Mod: LT | Performed by: RADIOLOGY

## 2024-11-08 PROCEDURE — C1758 CATHETER, URETERAL: HCPCS | Performed by: UROLOGY

## 2024-11-08 PROCEDURE — 87206 SMEAR FLUORESCENT/ACID STAI: CPT | Performed by: UROLOGY

## 2024-11-08 PROCEDURE — C1769 GUIDE WIRE: HCPCS | Performed by: UROLOGY

## 2024-11-08 PROCEDURE — 36000706: Performed by: UROLOGY

## 2024-11-08 PROCEDURE — 71000016 HC POSTOP RECOV ADDL HR: Performed by: UROLOGY

## 2024-11-08 RX ORDER — ONDANSETRON HYDROCHLORIDE 2 MG/ML
INJECTION, SOLUTION INTRAVENOUS
Status: DISCONTINUED | OUTPATIENT
Start: 2024-11-08 | End: 2024-11-08

## 2024-11-08 RX ORDER — DEXAMETHASONE SODIUM PHOSPHATE 4 MG/ML
INJECTION, SOLUTION INTRA-ARTICULAR; INTRALESIONAL; INTRAMUSCULAR; INTRAVENOUS; SOFT TISSUE
Status: DISCONTINUED | OUTPATIENT
Start: 2024-11-08 | End: 2024-11-08

## 2024-11-08 RX ORDER — FENTANYL CITRATE 50 UG/ML
INJECTION, SOLUTION INTRAMUSCULAR; INTRAVENOUS
Status: DISCONTINUED | OUTPATIENT
Start: 2024-11-08 | End: 2024-11-08

## 2024-11-08 RX ORDER — PHENAZOPYRIDINE HYDROCHLORIDE 100 MG/1
100 TABLET, FILM COATED ORAL
Status: DISCONTINUED | OUTPATIENT
Start: 2024-11-08 | End: 2024-11-08 | Stop reason: HOSPADM

## 2024-11-08 RX ORDER — PHENYLEPHRINE HYDROCHLORIDE 10 MG/ML
INJECTION INTRAVENOUS
Status: DISCONTINUED | OUTPATIENT
Start: 2024-11-08 | End: 2024-11-08

## 2024-11-08 RX ORDER — ROCURONIUM BROMIDE 10 MG/ML
INJECTION, SOLUTION INTRAVENOUS
Status: DISCONTINUED | OUTPATIENT
Start: 2024-11-08 | End: 2024-11-08

## 2024-11-08 RX ORDER — SODIUM CHLORIDE 0.9 % (FLUSH) 0.9 %
2 SYRINGE (ML) INJECTION EVERY 6 HOURS
Status: DISCONTINUED | OUTPATIENT
Start: 2024-11-08 | End: 2024-11-08 | Stop reason: HOSPADM

## 2024-11-08 RX ORDER — DEXMEDETOMIDINE HYDROCHLORIDE 100 UG/ML
INJECTION, SOLUTION INTRAVENOUS
Status: DISCONTINUED | OUTPATIENT
Start: 2024-11-08 | End: 2024-11-08

## 2024-11-08 RX ORDER — FENTANYL CITRATE 50 UG/ML
25 INJECTION, SOLUTION INTRAMUSCULAR; INTRAVENOUS EVERY 5 MIN PRN
Status: COMPLETED | OUTPATIENT
Start: 2024-11-08 | End: 2024-11-08

## 2024-11-08 RX ORDER — LIDOCAINE HYDROCHLORIDE 20 MG/ML
INJECTION, SOLUTION EPIDURAL; INFILTRATION; INTRACAUDAL; PERINEURAL
Status: DISCONTINUED | OUTPATIENT
Start: 2024-11-08 | End: 2024-11-08

## 2024-11-08 RX ORDER — ACETAMINOPHEN 500 MG
1000 TABLET ORAL
Status: COMPLETED | OUTPATIENT
Start: 2024-11-08 | End: 2024-11-08

## 2024-11-08 RX ORDER — OXYCODONE HYDROCHLORIDE 5 MG/1
5 TABLET ORAL EVERY 4 HOURS PRN
Qty: 10 TABLET | Refills: 0 | Status: SHIPPED | OUTPATIENT
Start: 2024-11-08 | End: 2024-11-08

## 2024-11-08 RX ORDER — NITROFURANTOIN 25; 75 MG/1; MG/1
100 CAPSULE ORAL 2 TIMES DAILY
Qty: 10 CAPSULE | Refills: 0 | Status: SHIPPED | OUTPATIENT
Start: 2024-11-08 | End: 2024-11-13

## 2024-11-08 RX ORDER — SCOLOPAMINE TRANSDERMAL SYSTEM 1 MG/1
1 PATCH, EXTENDED RELEASE TRANSDERMAL
Status: DISCONTINUED | OUTPATIENT
Start: 2024-11-08 | End: 2024-11-08 | Stop reason: HOSPADM

## 2024-11-08 RX ORDER — GLUCAGON 1 MG
1 KIT INJECTION
Status: DISCONTINUED | OUTPATIENT
Start: 2024-11-08 | End: 2024-11-08 | Stop reason: HOSPADM

## 2024-11-08 RX ORDER — OXYCODONE HYDROCHLORIDE 5 MG/1
5 TABLET ORAL ONCE
Status: COMPLETED | OUTPATIENT
Start: 2024-11-08 | End: 2024-11-08

## 2024-11-08 RX ORDER — CEFAZOLIN 2 G/1
2 INJECTION, POWDER, FOR SOLUTION INTRAMUSCULAR; INTRAVENOUS
Status: COMPLETED | OUTPATIENT
Start: 2024-11-08 | End: 2024-11-08

## 2024-11-08 RX ORDER — MIDAZOLAM HYDROCHLORIDE 1 MG/ML
INJECTION INTRAMUSCULAR; INTRAVENOUS
Status: DISCONTINUED | OUTPATIENT
Start: 2024-11-08 | End: 2024-11-08

## 2024-11-08 RX ORDER — OXYCODONE HYDROCHLORIDE 5 MG/1
5 TABLET ORAL EVERY 4 HOURS PRN
Qty: 10 TABLET | Refills: 0 | Status: SHIPPED | OUTPATIENT
Start: 2024-11-08

## 2024-11-08 RX ORDER — HALOPERIDOL 5 MG/ML
0.5 INJECTION INTRAMUSCULAR EVERY 10 MIN PRN
Status: DISCONTINUED | OUTPATIENT
Start: 2024-11-08 | End: 2024-11-08 | Stop reason: HOSPADM

## 2024-11-08 RX ORDER — PROPOFOL 10 MG/ML
VIAL (ML) INTRAVENOUS
Status: DISCONTINUED | OUTPATIENT
Start: 2024-11-08 | End: 2024-11-08

## 2024-11-08 RX ORDER — PHENAZOPYRIDINE HYDROCHLORIDE 200 MG/1
200 TABLET, FILM COATED ORAL 3 TIMES DAILY PRN
Qty: 21 TABLET | Refills: 0 | Status: SHIPPED | OUTPATIENT
Start: 2024-11-08 | End: 2024-11-15

## 2024-11-08 RX ADMIN — PROPOFOL 50 MG: 10 INJECTION, EMULSION INTRAVENOUS at 11:11

## 2024-11-08 RX ADMIN — PHENYLEPHRINE HYDROCHLORIDE 100 MCG: 10 INJECTION INTRAVENOUS at 11:11

## 2024-11-08 RX ADMIN — SODIUM CHLORIDE, SODIUM GLUCONATE, SODIUM ACETATE, POTASSIUM CHLORIDE, MAGNESIUM CHLORIDE, SODIUM PHOSPHATE, DIBASIC, AND POTASSIUM PHOSPHATE: .53; .5; .37; .037; .03; .012; .00082 INJECTION, SOLUTION INTRAVENOUS at 10:11

## 2024-11-08 RX ADMIN — FENTANYL CITRATE 25 MCG: 50 INJECTION, SOLUTION INTRAMUSCULAR; INTRAVENOUS at 03:11

## 2024-11-08 RX ADMIN — PHENYLEPHRINE HYDROCHLORIDE 200 MCG: 10 INJECTION INTRAVENOUS at 12:11

## 2024-11-08 RX ADMIN — OXYCODONE 5 MG: 5 TABLET ORAL at 03:11

## 2024-11-08 RX ADMIN — PHENYLEPHRINE HYDROCHLORIDE 200 MCG: 10 INJECTION INTRAVENOUS at 11:11

## 2024-11-08 RX ADMIN — ONDANSETRON 4 MG: 2 INJECTION INTRAMUSCULAR; INTRAVENOUS at 12:11

## 2024-11-08 RX ADMIN — PHENYLEPHRINE HYDROCHLORIDE 100 MCG: 10 INJECTION INTRAVENOUS at 12:11

## 2024-11-08 RX ADMIN — LIDOCAINE HYDROCHLORIDE 60 MG: 20 INJECTION, SOLUTION EPIDURAL; INFILTRATION; INTRACAUDAL; PERINEURAL at 10:11

## 2024-11-08 RX ADMIN — ACETAMINOPHEN 1000 MG: 500 TABLET ORAL at 09:11

## 2024-11-08 RX ADMIN — ROCURONIUM BROMIDE 30 MG: 10 INJECTION, SOLUTION INTRAVENOUS at 11:11

## 2024-11-08 RX ADMIN — DEXMEDETOMIDINE 4 MCG: 100 INJECTION, SOLUTION, CONCENTRATE INTRAVENOUS at 11:11

## 2024-11-08 RX ADMIN — DEXAMETHASONE SODIUM PHOSPHATE 8 MG: 4 INJECTION, SOLUTION INTRAMUSCULAR; INTRAVENOUS at 11:11

## 2024-11-08 RX ADMIN — FENTANYL CITRATE 50 MCG: 50 INJECTION, SOLUTION INTRAMUSCULAR; INTRAVENOUS at 10:11

## 2024-11-08 RX ADMIN — SUGAMMADEX 200 MG: 100 INJECTION, SOLUTION INTRAVENOUS at 01:11

## 2024-11-08 RX ADMIN — CEFAZOLIN 2 G: 2 INJECTION, POWDER, FOR SOLUTION INTRAMUSCULAR; INTRAVENOUS at 11:11

## 2024-11-08 RX ADMIN — ROCURONIUM BROMIDE 50 MG: 10 INJECTION, SOLUTION INTRAVENOUS at 10:11

## 2024-11-08 RX ADMIN — MIDAZOLAM HYDROCHLORIDE 2 MG: 2 INJECTION, SOLUTION INTRAMUSCULAR; INTRAVENOUS at 10:11

## 2024-11-08 RX ADMIN — FENTANYL CITRATE 50 MCG: 50 INJECTION, SOLUTION INTRAMUSCULAR; INTRAVENOUS at 12:11

## 2024-11-08 RX ADMIN — FENTANYL CITRATE 25 MCG: 50 INJECTION, SOLUTION INTRAMUSCULAR; INTRAVENOUS at 01:11

## 2024-11-08 RX ADMIN — PROPOFOL 150 MG: 10 INJECTION, EMULSION INTRAVENOUS at 10:11

## 2024-11-08 RX ADMIN — SCOPALAMINE 1 PATCH: 1 PATCH, EXTENDED RELEASE TRANSDERMAL at 09:11

## 2024-11-08 RX ADMIN — FENTANYL CITRATE 25 MCG: 50 INJECTION, SOLUTION INTRAMUSCULAR; INTRAVENOUS at 02:11

## 2024-11-08 NOTE — TRANSFER OF CARE
"Anesthesia Transfer of Care Note    Patient: Sonia Summers    Procedure(s) Performed: Procedure(s) (LRB):  NEPHROLITHOTRIPSY, PERCUTANEOUS, USING LASER (Left)  NEPHROSTOGRAM, ANTEGRADE (Left)  CREATION, NEPHROSTOMY, PERCUTANEOUS (Left)  CYSTOSCOPY,WITH URETERAL CATHETER INSERTION (Left)  PYELOGRAM, RETROGRADE (Left)    Patient location: PACU    Anesthesia Type: general    Transport from OR: Transported from OR on 6-10 L/min O2 by face mask with adequate spontaneous ventilation    Post pain: adequate analgesia    Post assessment: no apparent anesthetic complications    Post vital signs: stable    Level of consciousness: awake    Nausea/Vomiting: no nausea/vomiting    Complications: none    Transfer of care protocol was followed      Last vitals: Visit Vitals  BP (!) 146/75 (BP Location: Left arm, Patient Position: Lying)   Pulse 83   Temp 36.8 °C (98.2 °F) (Skin)   Resp 18   Ht 5' 6" (1.676 m)   Wt 59.9 kg (132 lb 0.9 oz)   SpO2 100%   Breastfeeding No   BMI 21.31 kg/m²     "

## 2024-11-08 NOTE — DISCHARGE INSTRUCTIONS
Postoperative Instructions for Stone Surgery    1. Hydration and Fluid Intake:     - It is essential to drink plenty of fluids following the ureteroscopy procedure to promote flushing of the urinary system and prevent dehydration.     - Aim to drink at least 8 to 10 glasses of water or other non-caffeinated, non-alcoholic beverages daily, unless instructed otherwise by your healthcare provider.    2. Pain Management:     - You may experience mild to moderate discomfort or pain after the ureteroscopy procedure. This is usually manageable with over-the-counter pain relievers such as acetaminophen or nonsteroidal anti-inflammatory drugs (NSAIDs).     - If prescribed pain medication, take it as instructed, following the prescribed dosage and frequency.    3. Urinary Symptoms:     - It is common to experience some urinary symptoms after the procedure, such as urgency, frequency, burning sensation, or blood in the urine (hematuria). These symptoms should gradually improve within a few days.     - If the symptoms worsen or if you notice significant blood clots or inability to urinate, contact your healthcare provider.    4. Activity and Rest:     - It is advisable to take it easy and get plenty of rest for the first few days following the procedure. Avoid strenuous activities, heavy lifting, or vigorous exercise for 1-2 days.     - Gradually resume normal activities as you feel comfortable, but listen to your body and avoid overexertion.    5. Diet and Nutrition:     - Follow any dietary recommendations provided by your healthcare provider. In general, maintain a balanced diet with adequate fiber intake to prevent constipation.     - Avoid excessive consumption of salt, spicy foods, and caffeinated or carbonated beverages, as these may irritate the urinary system.    6. Follow-up Appointments:     - Attend all scheduled follow-up appointments with your healthcare provider to monitor your recovery and assess the success of  the stone removal.     - You will be contacted via phone or the patient portal about any follow up appointments and tests/imaging in about 1-2 weeks.     - Additional imaging or laboratory tests may be ordered to evaluate the effectiveness of the procedure.    7. Signs of Complications:     - Be aware of potential complications and contact your healthcare provider if you experience any of the following: persistent or worsening pain, fever, chills, excessive fatigue, severe bleeding, inability to pass urine, or signs of infection.    8. Medication Compliance:     - If prescribed any medications, such as antibiotics or medications to prevent stone recurrence, take them as instructed by your healthcare provider. Follow the recommended dosage and complete the full course of medication.    If you have any additional questions, call [phone] and ask for your provider. If after hours (night or weekends) ask for urology on call and you will be directed to the appropriate provider.

## 2024-11-08 NOTE — ANESTHESIA POSTPROCEDURE EVALUATION
Anesthesia Post Evaluation    Patient: Sonia Summers    Procedure(s) Performed: Procedure(s) (LRB):  NEPHROLITHOTRIPSY, PERCUTANEOUS, USING LASER (Left)  NEPHROSTOGRAM, ANTEGRADE (Left)  CREATION, NEPHROSTOMY, PERCUTANEOUS (Left)  CYSTOSCOPY,WITH URETERAL CATHETER INSERTION (Left)  PYELOGRAM, RETROGRADE (Left)    Final Anesthesia Type: general      Patient location during evaluation: PACU  Patient participation: Yes- Able to Participate  Level of consciousness: awake and alert and oriented  Post-procedure vital signs: reviewed and stable  Pain management: adequate  Airway patency: patent    PONV status at discharge: No PONV  Anesthetic complications: no      Cardiovascular status: hemodynamically stable  Respiratory status: unassisted and spontaneous ventilation  Hydration status: euvolemic  Follow-up not needed.          Vitals Value Taken Time   /63 11/08/24 1531   Temp 36.7 °C (98.1 °F) 11/08/24 1315   Pulse 54 11/08/24 1541   Resp 44 11/08/24 1541   SpO2 100 % 11/08/24 1541   Vitals shown include unfiled device data.      No case tracking events are documented in the log.      Pain/Palmer Score: Pain Rating Prior to Med Admin: 8 (11/8/2024  3:40 PM)  Palmer Score: 9 (11/8/2024  1:30 PM)           caffeine

## 2024-11-08 NOTE — BRIEF OP NOTE
Kwesi Stone - Surgery ([facility])  Brief Operative Note    Surgery Date: 11/8/2024     Surgeons and Role:     * Virgie Sprague MD - Primary     * Mark Cuadra MD - Assisting     * Servando Quigley MD - Resident - Assisting     * Dee Dee Brock MD - Resident - Assisting        Pre-op Diagnosis:  Left renal stone [N20.0]  Recurrent UTI [N39.0]    Post-op Diagnosis:  Post-Op Diagnosis Codes:     * Left renal stone [N20.0]     * Recurrent UTI [N39.0]    Procedure(s) (LRB):  NEPHROLITHOTRIPSY, PERCUTANEOUS, USING LASER (Left)  NEPHROSTOGRAM, ANTEGRADE (Left)  CREATION, NEPHROSTOMY, PERCUTANEOUS (Left)  CYSTOSCOPY,WITH URETERAL CATHETER INSERTION (Left)  PYELOGRAM, RETROGRADE (Left)    Anesthesia: General    Operative Findings: L antegrade ureteroscopy with laser litho, upper pole access obtained by IR.      Estimated Blood Loss: min          Specimens:   Specimen (24h ago, onward)      None              Discharge Note    OUTCOME: Patient tolerated treatment/procedure well without complication and is now ready for discharge.    DISPOSITION: Home or Self Care    FINAL DIAGNOSIS:  nephrolithiasis     FOLLOWUP: In clinic    DISCHARGE INSTRUCTIONS:    Discharge Procedure Orders   CBC Without Differential   Standing Status: Future Number of Occurrences: 1 Standing Exp. Date: 12/16/25     Comprehensive Metabolic Panel   Standing Status: Future Number of Occurrences: 1 Standing Exp. Date: 12/16/25     Type & Screen   Standing Status: Future Number of Occurrences: 1 Standing Exp. Date: 12/16/25

## 2024-11-08 NOTE — ANESTHESIA PROCEDURE NOTES
Intubation    Date/Time: 11/8/2024 11:00 AM    Performed by: Afia Gifford MD  Authorized by: Virgie Lawson MD    Intubation:     Induction:  Intravenous    Intubated:  Postinduction    Mask Ventilation:  Easy mask    Attempts:  1    Attempted By:  Resident anesthesiologist    Method of Intubation:  Direct    Blade:  Jase 3    Laryngeal View Grade: Grade I - full view of cords      Difficult Airway Encountered?: No      Complications:  None    Airway Device:  Oral endotracheal tube    Airway Device Size:  7.0    Style/Cuff Inflation:  Cuffed (inflated to minimal occlusive pressure)    Tube secured:  23    Secured at:  The teeth    Placement Verified By:  Capnometry    Complicating Factors:  None    Findings Post-Intubation:  BS equal bilateral

## 2024-11-08 NOTE — OP NOTE
Kwesi rod - Surgery ([facility])  Urology Department  Operative Note    Date of Procedure: 11/8/2024     Pre-Operative Diagnosis:   Left lower pole renal stones    Post-Operative Diagnosis: same    Procedure(s) Performed:   Left PCNL (< 2 cm)  Left nephrostomy tube placement  Left antegrade nephrostogram  Left antegrade ureteroscopy  Laser lithotripsy  Left ureteral catheter placement   Cystoscopy    Specimen(s):  Left renal stone for culture and analysis    Staff Surgeon: Virgie Sprague MD    Assistant Surgeon:  MD Servando Childs MD    Circulator: Beck Ortiz RN  Relief Circulator: Angela Houston RN  Relief Scrub: Beatrice Diaz ST  Scrub Person: Nelsy Smith ST     Anesthesia: General endotracheal anesthesia    Indications: Sonia Summers is a 49 y.o. female with a left lower pole stone and recurrent UTIs inaccessible by retrograde ureteroscopy presenting for definitive management. She has been extensively counseled on the options for stones and understands the risks, benefits, and alternatives. She has elected to proceed with PCNL.     Findings:  Upper pole access on left kidney obtained by IR  Left lower pole stones lasered and basket extracted to completion  No residual stone fragments visualized upon flexible ureteroscopy    Estimated Blood Loss: min     Drains: none    Procedure in detail:  After the risks, benefits and possible complications of the procedure were explained, the patient elected to undergo the above procedures and informed consent was obtained. The patient was taken to the OR and placed under general anesthesia. Pre-operative antibiotics were administered. Time out was performed.  SCDs were applied and working prior to the procedure.    On the stretcher, flexible cystoscopy was performed. The patient was prepped and draped in the normal sterile fashion. A 16 [place]er flexible cystoscope was advanced into the bladder per urethra. The right and left ureteral orifices were  identified in the normal anatomic position. Our attention was turned to the patients left ureteral orifice.  A 5 Fr open ended catheter was passed into the left UO and up into the renal pelvis until mild resistance was met.  A 16 Fr curran was placed and the catheter was secured to the curran.      The patient was positioned into the prone position upon the operating table. They were then prepped and draped in the usual sterile fashion. IR then obtained upper pole access, please see their note for full details.   A stiff wire and a motion wire were placed by IR from the renal pelvis into the bladder.  An antegrade nephrostogram was performed to delineate the collecting system. A 12/14 35 cm ureteral access sheath was advanced over the stiff wire into the renal pelvis. A motion wire was left in place to act as a safety wire.  This was confirmed on fluoroscopy.     The flexible ureteroscope was advanced into the sheath. The collecting system was visualized. The UPJ was identified and stones were encountered within the lower pole.   The laser fiber was inserted per the scope and the stones were fragmented.  An basket was inserted per the scope and fragments were removed and passed off the field for analysis. Systematic pyeloscopy was performed and no additional stone fragments were visualized. The scope and ureteral access sheath were removed.    A sterile compressive dressing was placed. The patient was then transferred to PACU in stable condition.     Disposition:  The patient will be discharged home after a chest X ray in PACU.     Dee Dee Brock MD

## 2024-11-08 NOTE — H&P
"[facility]   H&P  SUBJECTIVE:     Chief Complaint: nephrolithiasis     History of Present Illness:  Sonia Summers is a 49 y.o. female w/ L lower pole stone and recurrent UTI who presents today for L neph tube placement, L antegrade URS.        OBJECTIVE:     Vital Signs (Most Recent)  Temp: 98.2 °F (36.8 °C) (11/08/24 0935)  Pulse: 83 (11/08/24 0935)  Resp: 18 (11/08/24 0935)  BP: (!) 146/75 (11/08/24 0935)  SpO2: 100 % (11/08/24 0935)    Estimated body mass index is 21.31 kg/m² as calculated from the following:    Height as of this encounter: 5' 6" (1.676 m).    Weight as of this encounter: 59.9 kg (132 lb 0.9 oz).    General: No acute distress, well developed. AAOx3  Head: Normocephalic, Atraumatic  CV: regular rate  Lungs: normal respiratory effort, no respiratory distress    Lab Results   Component Value Date    BUN 8 10/28/2024    CREATININE 0.8 10/28/2024    WBC 7.05 10/28/2024    HGB 14.6 10/28/2024    HCT 45.6 10/28/2024     10/28/2024    AST 16 10/28/2024    ALT 11 10/28/2024    ALKPHOS 74 10/28/2024    ALBUMIN 4.5 10/28/2024        ASSESSMENT     1. Preop testing    2. Nephrolithiasis      PLAN:     1. To OR for L neph tube placement, L antegrade URS, L nephrostogram/retrogade pyelogram, cysto, L ureteral catheter placement, possible laser lithotripsy  2. Surgery and blood consents signed  3. Urine dip negative for infection    Dee Dee Brock MD    "

## 2024-11-08 NOTE — PLAN OF CARE
Chart reviewed, preop completed, safe surgery checklist completed pending surgical consents, anesth consents, updated H&P. Patients family at bedside, belongings to be placed in locker. Call light in reach, bed low & locked.

## 2024-11-08 NOTE — H&P
Radiology History & Physical      SUBJECTIVE:     Chief Complaint: L ureteral obstruction    History of Present Illness:  Sonia Summers is a 49 y.o. female who presents for L upper pole nephrostomy access.    Past Medical History:   Diagnosis Date    Anemia     Disorder of kidney and ureter     PONV (postoperative nausea and vomiting)      Past Surgical History:   Procedure Laterality Date     SECTION      CYSTOSCOPY  2022    Procedure: CYSTOSCOPY;  Surgeon: Virgie Sprague MD;  Location: [facility] OR [address] [place] [address];  Service: Urology;;    CYSTOSCOPY W/ URETERAL STENT PLACEMENT Left 2022    Procedure: CYSTOSCOPY, WITH URETERAL STENT INSERTION;  Surgeon: Jazz Wood MD;  Location: [facility] OR [address] [place] [address];  Service: Urology;  Laterality: Left;    HAND SURGERY Left     LASER LITHOTRIPSY  2022    Procedure: LITHOTRIPSY, USING LASER;  Surgeon: Virgie Sprague MD;  Location: [facility] OR [address] [place] [address];  Service: Urology;;    PYELOSCOPY  2022    Procedure: PYELOSCOPY;  Surgeon: Virgie Sprague MD;  Location: [facility] OR [address] [place] [address];  Service: Urology;;    REPLACEMENT OF STENT Left 2022    Procedure: REPLACEMENT, STENT;  Surgeon: Virgie Sprague MD;  Location: [facility] OR [address] [place] [address];  Service: Urology;  Laterality: Left;    URETEROSCOPIC REMOVAL OF URETERIC CALCULUS Left 2022    Procedure: REMOVAL, CALCULUS, URETER, URETEROSCOPIC;  Surgeon: Virgie Sprague MD;  Location: [address];  Service: Urology;  Laterality: Left;    URETEROSCOPY Left 2022    Procedure: URETEROSCOPY;  Surgeon: Virgie Sprague MD;  Location: [facility] OR [address] [place] [address];  Service: Urology;  Laterality: Left;       Home Meds:   Prior to Admission medications    Medication Sig Start Date End Date Taking? Authorizing Provider   ciprofloxacin HCl (CIPRO) 500 MG tablet Take 1 tablet (500 mg total) by mouth 2 (two) times daily. for 10 days 24 Yes Virgie Sprague MD   hydroCHLOROthiazide  "(MICROZIDE) 12.5 mg capsule Take 12.5 mg by mouth. 5/2/23  Yes Provider, Historical   nitrofurantoin (MACRODANTIN) 50 MG capsule Take 1 capsule (50 mg total) by mouth once daily. 10/29/24  Yes Virgie Sprague MD   vitamin D (VITAMIN D3) 1000 units Tab Take 1,000 Units by mouth.   Yes Provider, Historical   RESTASIS 0.05 % ophthalmic emulsion  7/29/22   Provider, Historical     Anticoagulants/Antiplatelets: no anticoagulation    Allergies:   Review of patient's allergies indicates:   Allergen Reactions    Sulfa (sulfonamide antibiotics) Rash     Sedation History:  no adverse reactions    Review of Systems:   Hematological: no known coagulopathies  Respiratory: no shortness of breath  Cardiovascular: no chest pain  Gastrointestinal: no abdominal pain  Genito-Urinary: no dysuria  Musculoskeletal: negative  Neurological: no TIA or stroke symptoms         OBJECTIVE:     Vital Signs (Most Recent)  Temp: 98.2 °F (36.8 °C) (11/08/24 0935)  Pulse: 83 (11/08/24 0935)  Resp: 18 (11/08/24 0935)  BP: (!) 146/75 (11/08/24 0935)  SpO2: 100 % (11/08/24 0935)    Physical Exam:  ASA: per anesthesia  Mallampati: per anesthesia    General: no acute distress  Mental Status: alert and oriented to person, place and time  HEENT: normocephalic, atraumatic  Chest: unlabored breathing  Abdomen: nondistended  Extremity: moves all extremities    Laboratory  No results found for: "INR", "PT", "PTT"    Lab Results   Component Value Date    WBC 7.05 10/28/2024    HGB 14.6 10/28/2024    HCT 45.6 10/28/2024    MCV 94 10/28/2024     10/28/2024      Lab Results   Component Value Date    GLU 86 10/28/2024     10/28/2024    K 4.0 10/28/2024     10/28/2024    CO2 27 10/28/2024    BUN 8 10/28/2024    CREATININE 0.8 10/28/2024    CALCIUM 9.4 10/28/2024    ALT 11 10/28/2024    AST 16 10/28/2024    ALBUMIN 4.5 10/28/2024    BILITOT 0.8 10/28/2024       ASSESSMENT/PLAN:     Sedation Plan: per anesthesia  Patient will undergo L upper " pole nephrostomy.    Evin Denis MD  Department of Radiology, PGY-3

## 2024-11-08 NOTE — PROGRESS NOTES
Pt discharged per orders. AAOx'a 3. VSS. No s/s of acute distress. Resp even and unlabored. Minimal complaints of pain verbalized. IV removed prior to discharge. Reviewed discharge instructions, follow up care/appointments with patient and spouse. Patient and spouse verbalized understanding of discharge and follow up care/appointments. Discharged with all personal belongings.Escorted out with staff in wheelchair.Pt transported home via personal transportation.

## 2024-11-09 NOTE — PROCEDURES
Radiology Post-Procedure Note    Pre Op Diagnosis: Nephrolithiasis    Post Op Diagnosis: Same    Procedure: PCNL access    Procedure performed by: Mark Cuadra MD    Written Informed Consent Obtained: Yes  Specimen Removed: NO  Estimated Blood Loss: Minimal    Findings:   Under fluoroscopic guidance, following distension of the collecting system, an upper pole calyx was accessed for PCNL purposes. No complications. See dictation and urology notes.    Patient tolerated procedure well.    Mark Cuadra M.D.  Interventional Radiology  Department of Radiology

## 2024-11-10 ENCOUNTER — PATIENT MESSAGE (OUTPATIENT)
Dept: UROLOGY | Facility: CLINIC | Age: 49
End: 2024-11-10
Payer: OTHER GOVERNMENT

## 2024-11-11 LAB
ACID FAST MOD KINY STN SPEC: NORMAL
MYCOBACTERIUM SPEC QL CULT: NORMAL

## 2024-11-12 LAB — BACTERIA SPEC AEROBE CULT: NO GROWTH

## 2024-11-13 LAB — FUNGUS SPEC CULT: NORMAL

## 2024-11-15 LAB — BACTERIA SPEC ANAEROBE CULT: NORMAL

## 2024-11-16 LAB
COMPN STONE: NORMAL
LABORATORY COMMENT REPORT: NORMAL
SPECIMEN SOURCE: NORMAL
STONE ANALYSIS IR-IMP: NORMAL

## 2024-12-10 ENCOUNTER — OFFICE VISIT (OUTPATIENT)
Dept: OBSTETRICS AND GYNECOLOGY | Facility: CLINIC | Age: 49
End: 2024-12-10
Payer: OTHER GOVERNMENT

## 2024-12-10 VITALS — BODY MASS INDEX: 20.51 KG/M2 | HEIGHT: 66 IN | WEIGHT: 127.63 LBS

## 2024-12-10 DIAGNOSIS — N95.1 PERIMENOPAUSAL: ICD-10-CM

## 2024-12-10 DIAGNOSIS — Z97.5 IUD (INTRAUTERINE DEVICE) IN PLACE: ICD-10-CM

## 2024-12-10 DIAGNOSIS — Z01.419 ENCOUNTER FOR GYNECOLOGICAL EXAMINATION WITHOUT ABNORMAL FINDING: Primary | ICD-10-CM

## 2024-12-10 PROCEDURE — 99213 OFFICE O/P EST LOW 20 MIN: CPT | Mod: PBBFAC,PN | Performed by: OBSTETRICS & GYNECOLOGY

## 2024-12-10 PROCEDURE — 99999 PR PBB SHADOW E&M-EST. PATIENT-LVL III: CPT | Mod: PBBFAC,,, | Performed by: OBSTETRICS & GYNECOLOGY

## 2024-12-10 PROCEDURE — 99396 PREV VISIT EST AGE 40-64: CPT | Mod: S$PBB,,, | Performed by: OBSTETRICS & GYNECOLOGY

## 2024-12-10 NOTE — PROGRESS NOTES
CC: Well woman exam    Sonia Summers is a 49 y.o. female  presents for a well woman exam.  LMP: Patient's last menstrual period was 2024 (approximate)..   She has the MIRENA IUD since 2018.  She has vaginal irritation and recurrent UTIs due to her kidney and ureter disorder.      Past Medical History:   Diagnosis Date    Anemia     Disorder of kidney and ureter     PONV (postoperative nausea and vomiting)      Past Surgical History:   Procedure Laterality Date    ANTEGRADE NEPHROSTOGRAPHY Left 2024    Procedure: NEPHROSTOGRAM, ANTEGRADE;  Surgeon: Virgie Sprague MD;  Location: [facility] OR 2ND FLR;  Service: Urology;  Laterality: Left;     SECTION      CYSTOSCOPY  2022    Procedure: CYSTOSCOPY;  Surgeon: Virgie Sprague MD;  Location: [facility] OR 1ST FLR;  Service: Urology;;    CYSTOSCOPY W/ URETERAL STENT PLACEMENT Left 2022    Procedure: CYSTOSCOPY, WITH URETERAL STENT INSERTION;  Surgeon: Jazz Wood MD;  Location: [facility] OR 1ST FLR;  Service: Urology;  Laterality: Left;    CYSTOSCOPY,WITH URETERAL CATHETER INSERTION Left 2024    Procedure: CYSTOSCOPY,WITH URETERAL CATHETER INSERTION;  Surgeon: Virgie Sprague MD;  Location: [facility] OR 2ND FLR;  Service: Urology;  Laterality: Left;    HAND SURGERY Left     LASER LITHOTRIPSY  2022    Procedure: LITHOTRIPSY, USING LASER;  Surgeon: Virgie Sprague MD;  [facility];  Service: Urology;;    PERCUTANEOUS NEPHROLITHOTOMY (PCNL) USING LASER Left 2024    Procedure: NEPHROLITHOTRIPSY, PERCUTANEOUS, USING LASER;  Surgeon: Virgie Sprague MD;  Location: [facility] OR 2ND FLR;  Service: Urology;  Laterality: Left;  Room C1    PERCUTANEOUS NEPHROSTOMY Left 2024    Procedure: CREATION, NEPHROSTOMY, PERCUTANEOUS;  Surgeon: Virgie Sprague MD;  Location: [facility] OR 2ND FLR;  Service: Urology;  Laterality: Left;    PYELOSCOPY  2022    Procedure: PYELOSCOPY;  Surgeon:  Virgie Sprague MD;  Location: [facility] OR 1ST FLR;  Service: Urology;;    REPLACEMENT OF STENT Left 07/08/2022    Procedure: REPLACEMENT, STENT;  Surgeon: Virgie Sprague MD;  Location: [facility] OR 1ST FLR;  Service: Urology;  Laterality: Left;    RETROGRADE PYELOGRAPHY Left 11/8/2024    Procedure: PYELOGRAM, RETROGRADE;  Surgeon: Virgie Sprague MD;  Location: [facility] OR 2ND FLR;  Service: Urology;  Laterality: Left;    URETEROSCOPIC REMOVAL OF URETERIC CALCULUS Left 07/08/2022    Procedure: REMOVAL, CALCULUS, URETER, URETEROSCOPIC;  Surgeon: Virgie Sprague MD;  Location: [facility] OR 1ST FLR;  Service: Urology;  Laterality: Left;    URETEROSCOPY Left 07/08/2022    Procedure: URETEROSCOPY;  Surgeon: Virgie Sprague MD;  [facility];  Service: Urology;  Laterality: Left;     Social History     Socioeconomic History    Marital status:      Spouse name: Kwesi    Number of children: 2   Tobacco Use    Smoking status: Never    Smokeless tobacco: Never   Substance and Sexual Activity    Alcohol use: Yes     Comment: Daily wine    Drug use: Not Currently    Sexual activity: Yes     Partners: Male     Birth control/protection: I.U.D.   Social History Narrative    1 flight     Social Drivers of Health     Financial Resource Strain: Low Risk  (8/11/2024)    Overall Financial Resource Strain (CARDIA)     Difficulty of Paying Living Expenses: Not hard at all   Food Insecurity: No Food Insecurity (8/11/2024)    Hunger Vital Sign     Worried About Running Out of Food in the Last Year: Never true     Ran Out of Food in the Last Year: Never true   Transportation Needs: No Transportation Needs (7/30/2023)    Received from [facility] - Transportation     Lack of Transportation (Medical): No     Lack of Transportation (Non-Medical): No   Physical Activity: Insufficiently Active (8/11/2024)    Exercise Vital Sign     Days of Exercise per Week: 3 days     Minutes of Exercise per  "Session: 30 min   Stress: No Stress Concern Present (2024)    [employer] [place] [employer] - Occupational Stress Questionnaire     Feeling of Stress : Not at all   Housing Stability: Unknown (2024)    Housing Stability Vital Sign     Unable to Pay for Housing in the Last Year: No     Family History   Problem Relation Name Age of Onset    Breast cancer Neg Hx      Colon cancer Neg Hx      Cervical cancer Neg Hx      Uterine cancer Neg Hx      Ovarian cancer Neg Hx       OB History          2    Para   2    Term   1       1    AB        Living   2         SAB        IAB        Ectopic        Multiple        Live Births   2                 Ht 5' 6" (1.676 m)   Wt 57.9 kg (127 lb 10.3 oz)   LMP 2024 (Approximate) Comment: IUD (Mirena)  BMI 20.60 kg/m²       ROS:    ROS:  GENERAL: Denies weight gain or weight loss. Feeling well overall.   SKIN: Denies rash or lesions.   HEAD: Denies head injury or headache.   NODES: Denies enlarged lymph nodes.   CHEST: Denies chest pain or shortness of breath.   CARDIOVASCULAR: Denies palpitations or left sided chest pain.   ABDOMEN: No abdominal pain, constipation, diarrhea, nausea, vomiting or rectal bleeding.   URINARY: No frequency, dysuria, hematuria, or burning on urination.  REPRODUCTIVE: See HPI.   BREASTS: The patient performs breast self-examination and denies pain, lumps, or nipple discharge.   HEMATOLOGIC: No easy bruisability or excessive bleeding.   MUSCULOSKELETAL: Denies joint pain or swelling.   NEUROLOGIC: Denies syncope or weakness.   PSYCHIATRIC: Denies depression, anxiety or mood swings.    PHYSICAL EXAM:    APPEARANCE: Well nourished, well developed, in no acute distress.  AFFECT: WNL, alert and oriented x 3  SKIN: No acne or hirsutism  NECK: Neck symmetric without masses or thyromegaly  NODES: No inguinal, cervical, axillary, or femoral lymph node enlargement  CHEST: Good respiratory effect  ABDOMEN: Soft.  No " tenderness or masses.  No hepatosplenomegaly.  No hernias.  BREASTS: Symmetrical, no skin changes or visible lesions.  No palpable masses, nipple discharge bilaterally.  PELVIC: Normal external genitalia without lesions.  Normal hair distribution.  Adequate perineal body, normal urethral meatus.  Vagina moist and well rugated without lesions or discharge.  Cervix pink,  IUD string at the os and down into the lower vagina with mucus,  without lesions, discharge or tenderness.  No significant cystocele or rectocele.  Bimanual exam shows uterus to be normal size, regular, mobile and nontender.  Adnexa without masses or tenderness.    RECTAL: Rectovaginal exam confirms above with normal sphincter tone, no masses.  EXTREMITIES: No edema.      ICD-10-CM ICD-9-CM    1. Encounter for gynecological examination without abnormal finding  Z01.419 V72.31 HPV High Risk Genotypes, PCR      Liquid-Based Pap Smear, Screening      2. Perimenopausal  N95.1 627.2       3. IUD (intrauterine device) in place  Z97.5 V45.51             Patient was counseled today on A.C.S. Pap guidelines and recommendations for yearly pelvic exams, mammograms and monthly self breast exams; to see her PCP for other health maintenance.       Follow up in about 1 year (around 12/10/2025), or if symptoms worsen or fail to improve.

## 2025-02-14 ENCOUNTER — HOSPITAL ENCOUNTER (OUTPATIENT)
Dept: RADIOLOGY | Facility: HOSPITAL | Age: 50
Discharge: HOME | End: 2025-02-14

## 2025-02-14 DIAGNOSIS — N20.0 NEPHROLITHIASIS: ICD-10-CM

## 2025-02-14 PROCEDURE — 76770 US EXAM ABDO BACK WALL COMP: CPT | Mod: TC

## 2025-02-14 PROCEDURE — 76770 US EXAM ABDO BACK WALL COMP: CPT | Mod: 26,,, | Performed by: INTERNAL MEDICINE

## 2025-02-18 ENCOUNTER — OFFICE VISIT (OUTPATIENT)
Dept: UROLOGY | Facility: CLINIC | Age: 50
End: 2025-02-18
Payer: OTHER GOVERNMENT

## 2025-02-18 VITALS
HEART RATE: 73 BPM | SYSTOLIC BLOOD PRESSURE: 134 MMHG | DIASTOLIC BLOOD PRESSURE: 77 MMHG | WEIGHT: 136.88 LBS | HEIGHT: 66 IN | BODY MASS INDEX: 22 KG/M2

## 2025-02-18 DIAGNOSIS — N22 CALCULUS OF URINARY TRACT IN DISEASES CLASSIFIED ELSEWHERE: Primary | ICD-10-CM

## 2025-02-18 PROCEDURE — 99213 OFFICE O/P EST LOW 20 MIN: CPT | Mod: PBBFAC | Performed by: UROLOGY

## 2025-02-18 RX ORDER — NITROFURANTOIN MACROCRYSTALS 50 MG/1
50 CAPSULE ORAL DAILY
Qty: 30 CAPSULE | Refills: 1 | Status: SHIPPED | OUTPATIENT
Start: 2025-02-18

## 2025-02-18 NOTE — PROGRESS NOTES
Urology - [facility]  Clinic Note    SUBJECTIVE:     Chief Complaint: dysuria at the end of stream    History of Present Illness:  Sonia Summers is a 49 y.o. female who presents to clinic for follow up PCNL for left renal stone and history of recurrent ESBL UTI.     Had persistent UTI - attributed to stones.   Also thought this could have been after sexual activity. Hasn't been sexually active since 3/2025.     Had this happen in the past, everything better after ureteroscopy.     11/2024 PCNL  Left PCNL (< 2 cm)  Left nephrostomy tube placement  Left antegrade nephrostogram  Left antegrade ureteroscopy  Laser lithotripsy  Left ureteral catheter placement   Cystoscopy    Left ureteroscopy 7/8/22. Initially presented to the ED and received Left ureteral stent 6/17/22.  Stone was 90% calcium phosphate. Residual 6mm lower pole not attainable with flexible ureteroscopy. HF 1500.   She is seeing Dr. Stovall in nephrology.     Dr. Stovall Note  -History of calcium oxalate stones but appears calcium phosphate stones more problematic  -Litholink showed low oxaluria, low urate and UOP in excess of 3L  -Repeat Litholink 4/2023 high urine calcium, phos, pH  -Start hydrochlorothiazide 12.5 mg daily  -Add high K foods to diet  -Keep UOP brisk 3L daily     4/10/23  Right kidney: The right kidney measures 12.1 cm. No cortical thinning. No loss of corticomedullary distinction. Resistive index measures 0.65.  No mass. No renal stone. Extrarenal pelvis.  No hydronephrosis.     Left kidney: The left kidney measures 12.4 cm. No cortical thinning. No loss of corticomedullary distinction. Resistive index measures 0.67.  No mass. Three nonobstructing stones within the inferior pole ranging from 2-5 mm.  No hydronephrosis.     Impression:     Nonobstructing left renal stones.    7/28/22  1.  Left ureteroscopy  2.  Cystoscopy  3.  Laser lithotripsy  4.  Stone basket extraction  5.  Fluoro < 1 h  6.  Pyeloscopy  7.  Stent exchange    "  Specimen(s): Stone for analysis     Staff Surgeon: Virgie Sprague MD     Assistant Surgeon: MD Huy Martinez MD     Anesthesia: General endotracheal anesthesia     Indications: Sonia Summers is a 46 y.o. female with a left ureteral stone and renal stone, presenting for definitive stone management.  She currently does have a JJ ureteral stent in place.       Findings:   Distal left ureteral stone  Multiple left renal stones  Radiopaque stone fragment remains in lower pole    Urolithiasis  Patient reports resolution of left flank pain. Patient reports no further nausea, vomiting, fevers, hematuria, dysuria. Risk factors for urolithiasis: cola soft drink intake and history of stone disease.    Repeat KUB 22 shows persistence of 7 mm lower left kidney stone. Renal US with resolution of hydroureteronephrosis.      Anticoagulation:  No    OBJECTIVE:     Estimated body mass index is 22.1 kg/m² as calculated from the following:    Height as of this encounter: 5' 6" (1.676 m).    Weight as of this encounter: 62.1 kg (136 lb 14.5 oz).    Vital Signs (Most Recent)  Pulse: 73 (25 1027)  BP: 134/77 (25 1027)    Physical Exam  Vitals reviewed.   Pulmonary:      Effort: Pulmonary effort is normal.   Neurological:      Mental Status: She is alert.         Lab Results   Component Value Date    BUN 8 10/28/2024    CREATININE 0.8 10/28/2024    WBC 7.05 10/28/2024    HGB 14.6 10/28/2024    HCT 45.6 10/28/2024     10/28/2024    AST 16 10/28/2024    ALT 11 10/28/2024    ALKPHOS 74 10/28/2024    ALBUMIN 4.5 10/28/2024      Imagin/15/2014 ultrasound  The right kidney measures 12.9 cm in length and the left kidney measures 12.5 cm in length. The renal cortex has normal thickness and echogenicity. No solid masses.     Echogenic stones at the mid and lower poles of the left kidney measuring 2 mm and 3 mm in size. There is no distention of the collecting systems or ureters on either side. "     Images through the urinary bladder show no gross abnormalities.   ASSESSMENT     1. Calculus of urinary tract in diseases classified elsewhere          PLAN:   Sonia was seen today for follow-up.    Diagnoses and all orders for this visit:    Calculus of urinary tract in diseases classified elsewhere  -     CT Renal Stone Study ABD Pelvis WO; Future    Other orders  -     nitrofurantoin (MACRODANTIN) 50 MG capsule; Take 1 capsule (50 mg total) by mouth once daily.      Stay on post coital prophylaxis.     Virgie Sprague MD

## 2025-03-11 ENCOUNTER — HOSPITAL ENCOUNTER (OUTPATIENT)
Dept: RADIOLOGY | Facility: HOSPITAL | Age: 50
Discharge: HOME | End: 2025-03-11
Attending: UROLOGY
Payer: OTHER GOVERNMENT

## 2025-03-11 DIAGNOSIS — N22 CALCULUS OF URINARY TRACT IN DISEASES CLASSIFIED ELSEWHERE: ICD-10-CM

## 2025-03-11 PROCEDURE — 74176 CT ABD & PELVIS W/O CONTRAST: CPT | Mod: TC

## 2025-03-12 ENCOUNTER — PATIENT MESSAGE (OUTPATIENT)
Dept: UROLOGY | Facility: CLINIC | Age: 50
End: 2025-03-12
Payer: OTHER GOVERNMENT

## 2025-03-13 ENCOUNTER — RESULTS FOLLOW-UP (OUTPATIENT)
Dept: UROLOGY | Facility: CLINIC | Age: 50
End: 2025-03-13
Payer: OTHER GOVERNMENT

## 2025-03-13 DIAGNOSIS — N22 CALCULUS OF URINARY TRACT IN DISEASES CLASSIFIED ELSEWHERE: Primary | ICD-10-CM

## 2025-08-28 ENCOUNTER — HOSPITAL ENCOUNTER (OUTPATIENT)
Dept: RADIOLOGY | Facility: HOSPITAL | Age: 50
Discharge: HOME | End: 2025-08-28
Attending: UROLOGY
Payer: OTHER GOVERNMENT

## 2025-08-28 DIAGNOSIS — N22 CALCULUS OF URINARY TRACT IN DISEASES CLASSIFIED ELSEWHERE: ICD-10-CM

## 2025-08-28 PROCEDURE — 74018 RADEX ABDOMEN 1 VIEW: CPT | Mod: TC

## 2025-08-28 PROCEDURE — 76775 US EXAM ABDO BACK WALL LIM: CPT | Mod: 26,,, | Performed by: RADIOLOGY

## 2025-08-28 PROCEDURE — 74018 RADEX ABDOMEN 1 VIEW: CPT | Mod: 26,,, | Performed by: RADIOLOGY

## 2025-08-28 PROCEDURE — 76775 US EXAM ABDO BACK WALL LIM: CPT | Mod: TC

## 2025-09-03 ENCOUNTER — RESULTS FOLLOW-UP (OUTPATIENT)
Dept: UROLOGY | Facility: CLINIC | Age: 50
End: 2025-09-03
Payer: OTHER GOVERNMENT

## (undated) DEVICE — SET IRR URLGY 2LINE UNIV SPIKE

## (undated) DEVICE — GUIDE WIRE MOTION .035 X 150CM

## (undated) DEVICE — EXTRACTOR TIPLESS 2.4FRX1115CM

## (undated) DEVICE — SYR 50ML CATH TIP

## (undated) DEVICE — CANISTER OMNI-JUG SUCTION 16LT

## (undated) DEVICE — BLADE SURG CARBON STEEL SZ11

## (undated) DEVICE — PACK CYSTO

## (undated) DEVICE — BOWL STERILE LARGE 32OZ

## (undated) DEVICE — CATH POLLACK OPEN-END FLEXI-TI

## (undated) DEVICE — CATH URETERAL DL 10FR 24CM

## (undated) DEVICE — UNDERGLOVES BIOGEL PI SZ 6 LF

## (undated) DEVICE — TRAY CYSTO BASIN OMC

## (undated) DEVICE — DRESSING TRANS 4X4 TEGADERM

## (undated) DEVICE — VALVE OLYMPUS SCOPE SUCTION

## (undated) DEVICE — SUT ETHILON 2-0 PSLX 30IN

## (undated) DEVICE — SET UROLOGY TBNG UP TO 300MMHG

## (undated) DEVICE — DRAPE HALF SURGICAL 40X58IN

## (undated) DEVICE — ADHESIVE MASTISOL VIAL 48/BX

## (undated) DEVICE — SPONGE COTTON TRAY 4X4IN

## (undated) DEVICE — GUIDEWIRE STR TIP HIWIRE 150CM

## (undated) DEVICE — ADAPTER HOSE 10FT 8MM

## (undated) DEVICE — URETEROSCOPE LITHOVUE STANDARD

## (undated) DEVICE — SYR 10CC LUER LOCK

## (undated) DEVICE — DRAPE NEPHRSCPY SURG 72X118IN

## (undated) DEVICE — SOL IRR NACL .9% 3000ML

## (undated) DEVICE — GOWN SMARTGOWN LVL4 X-LONG XL

## (undated) DEVICE — PLUG CATHETER STERILE FOLEY

## (undated) DEVICE — TRAY CYSTO BASIN

## (undated) DEVICE — GOWN POLY REINF BRTH SLV XL

## (undated) DEVICE — FIBER MOSES 200 DFL

## (undated) DEVICE — SHEATH FLEXOR ACCESS 12X35

## (undated) DEVICE — PACK ECLIPSE SET-UP W/O DRAPE

## (undated) DEVICE — SHEATH FLEXOR URET 10.7FRX35CM

## (undated) DEVICE — BAG DRAIN ANTI REFLUX 2000ML

## (undated) DEVICE — SUT 2/0 30IN SILK BLK BRAI

## (undated) DEVICE — Device

## (undated) DEVICE — TRAY CATH 1-LYR URIMTR 16FR

## (undated) DEVICE — KIT LITHOCLAST TRILOGY 3.9X440

## (undated) DEVICE — SUT VICRYL CTD 2-0 GI 27 SH

## (undated) DEVICE — DRAPE C-ARM ELAS CLIP 42X120IN

## (undated) DEVICE — APPLICATOR CHLORAPREP ORN 26ML

## (undated) DEVICE — TUBING SUC UNIV W/CONN 12FT

## (undated) DEVICE — IRRIGATION SET Y-TYPE TUR/BLAD

## (undated) DEVICE — SYR 50CC LL

## (undated) DEVICE — SET EXTENSION STERILE 30IN

## (undated) DEVICE — SYR 30CC LUER LOCK